# Patient Record
Sex: FEMALE | Race: BLACK OR AFRICAN AMERICAN | NOT HISPANIC OR LATINO | URBAN - METROPOLITAN AREA
[De-identification: names, ages, dates, MRNs, and addresses within clinical notes are randomized per-mention and may not be internally consistent; named-entity substitution may affect disease eponyms.]

---

## 2017-01-19 ENCOUNTER — OFFICE (OUTPATIENT)
Dept: URBAN - METROPOLITAN AREA CLINIC 19 | Facility: CLINIC | Age: 42
End: 2017-01-19

## 2017-01-19 VITALS
HEIGHT: 66 IN | HEART RATE: 81 BPM | DIASTOLIC BLOOD PRESSURE: 75 MMHG | SYSTOLIC BLOOD PRESSURE: 114 MMHG | WEIGHT: 166 LBS

## 2017-01-19 DIAGNOSIS — K26.9 DUODENAL ULCER, UNSPECIFIED AS ACUTE OR CHRONIC, WITHOUT HEM: ICD-10-CM

## 2017-01-19 DIAGNOSIS — D50.0 IRON DEFICIENCY ANEMIA SECONDARY TO BLOOD LOSS (CHRONIC): ICD-10-CM

## 2017-01-19 LAB
CBC COMPLETE BLOOD COUNT W/O DIFF: HEMATOCRIT: 35.9 % — LOW (ref 36–48)
CBC COMPLETE BLOOD COUNT W/O DIFF: HEMOGLOBIN: 11.2 G/DL — LOW (ref 12–16)
CBC COMPLETE BLOOD COUNT W/O DIFF: MCH: 25.6 PG (ref 25–35)
CBC COMPLETE BLOOD COUNT W/O DIFF: MCHC: 31.2 % (ref 30–38)
CBC COMPLETE BLOOD COUNT W/O DIFF: MCV: 82 FL (ref 78–102)
CBC COMPLETE BLOOD COUNT W/O DIFF: PLATELET COUNT: 331 K/UL (ref 150–450)
CBC COMPLETE BLOOD COUNT W/O DIFF: RBC DISTRIBUTION WIDTH: 14.5 % (ref 11.5–16)
CBC COMPLETE BLOOD COUNT W/O DIFF: RED BLOOD CELL COUNT: 4.38 M/UL (ref 4–5.5)
CBC COMPLETE BLOOD COUNT W/O DIFF: WHITE BLOOD CELL COUNT: 5.2 K/UL (ref 4–11)
COMPREHENSIVE METABOLIC PANEL: ALBUMIN: 4.2 G/DL (ref 3.5–5.2)
COMPREHENSIVE METABOLIC PANEL: ALKALINE PHOSPHATASE: 73 U/L (ref 34–115)
COMPREHENSIVE METABOLIC PANEL: CALCIUM TOTAL: 9.7 MG/DL (ref 8.5–10.5)
COMPREHENSIVE METABOLIC PANEL: CARBON DIOXIDE: 24 MEQ/L (ref 21–31)
COMPREHENSIVE METABOLIC PANEL: CHLORIDE: 101 MEQ/L (ref 96–106)
COMPREHENSIVE METABOLIC PANEL: CREATININE: 0.69 MG/DL (ref 0.6–1.3)
COMPREHENSIVE METABOLIC PANEL: FASTING/NON-FASTING: (no result)
COMPREHENSIVE METABOLIC PANEL: GLUCOSE: 81 MG/DL (ref 65–100)
COMPREHENSIVE METABOLIC PANEL: POTASSIUM: 3.8 MEQ/ML (ref 3.5–5.4)
COMPREHENSIVE METABOLIC PANEL: SGOT (AST): 21 U/L (ref 13–40)
COMPREHENSIVE METABOLIC PANEL: SGPT (ALT): 12 U/L (ref 7–52)
COMPREHENSIVE METABOLIC PANEL: SODIUM: 141 MEQ/L (ref 135–145)
COMPREHENSIVE METABOLIC PANEL: TOTAL BILIRUBIN: 0.3 MG/DL (ref 0.3–1.2)
COMPREHENSIVE METABOLIC PANEL: TOTAL PROTEIN: 7.5 G/DL (ref 6.4–8.3)
COMPREHENSIVE METABOLIC PANEL: UREA NITROGEN: 8 MG/DL (ref 6–20)

## 2017-01-19 PROCEDURE — 99214 OFFICE O/P EST MOD 30 MIN: CPT | Performed by: INTERNAL MEDICINE

## 2017-01-19 RX ORDER — ADALIMUMAB 40MG/0.8ML
KIT SUBCUTANEOUS
Qty: 2 | Refills: 11 | Status: COMPLETED
End: 2018-02-06

## 2017-01-19 RX ORDER — DEXLANSOPRAZOLE 60 MG/1
60 CAPSULE, DELAYED RELEASE ORAL
Qty: 30 | Refills: 5 | Status: COMPLETED
Start: 2016-09-22 | End: 2018-03-08

## 2017-01-19 RX ORDER — SODIUM PICOSULFATE, MAGNESIUM OXIDE, AND ANHYDROUS CITRIC ACID 10; 3.5; 12 MG/16.1G; G/16.1G; G/16.1G
POWDER, METERED ORAL
Qty: 1 | Refills: 0 | Status: COMPLETED
Start: 2017-01-19 | End: 2017-11-16

## 2017-01-19 NOTE — SERVICEHPINOTES
42-year-old  black female with long-standing Crohn's disease returns after she tapered off prednisone a month or so ago and is doing pretty well. She was hospitalized at [facility] last fall for abdominal pain. Routine lab was normal. Stool C. difficile toxin was negative. Stool lactoferrin was positive. CT abdomen/pelvis 9/17/16 found "distended stomach with subtle bowel wall thickening involving the gastroduodenal junction. Underlying gastric outlet obstruction secondary to active Crohn's inflammatory change cannot be excluded." EGD 9/19/16 found only "mild narrowing of the bulb". There was no inflammation or ulceration identified . Biopsies found "benign ulcer/erosion of the duodenum." Stain was negative for H. pylori. She was started on prednisone 40 mg/d (discharged on 20 mg/d) (Entocort was held). Prior to admission, she was on Imuran, Cipro (noncompliant) and has been on Humira for a year. She takes an occasional Lortab for abdominal pain. I switched Remicade to Humira when I last saw her 10/9/15. She was also referred to [facility] research dept[facility] but she wanted to see how Humira worked before considering a protocol study. She started Remicade last March, but has had problems with hives in spite of premedication with Benadryl and Solu-Medrol. During one of her infusion, EMT's were called, but she was not taken to the emergency room. Overall, she felt Remicade improved her Crohn's symptoms, but she wanted to switch to another biologic agent and I agreed. She remains on Imuran, Alinia, Entocort, iron and hydrocodone. Before starting Remicade earlier this year she complained of RLQ pain and significant nonbloody diarrhea with 10-15 BM/d and nocturnal stooling.I've been concerned about her IBD symptoms for quite a while and asked Usama Valdez MD ([facility] Director) to help. He did not feel she was a candidate for protocol studies considering her previous surgeries. I wanted her to see [place] IBD expert, Adithya Pierre MD, but she refused to travel to [place]. She has a history of severe Crohn's disease, but has really done quite well well in recent years, but has been somewhat dependent on prednisone in the past 9 months or so. She has been on what I believe to be maximal IBD medical therapy with Cimzia, Imuran, Alinia and Cipro. Cimzia was started in 2009 after failing Remicade (two doses given). She has some anxiety with Cimzia, but this is tolerable at the current dose. I added Entocort EC last June, but she stopped it because of side effects (UTI and xs menses). Routine lab 5/11/15 was remarkable for a mild microcytic anemia (Hct=30.9% and elevated CRP=0.6 and ESR=65). The last lab I have for comparison was on 11/1/13 which found Hct=29.6%, CRP=11.2 and CYJ=396. CT abd/pelvis 11/1/13 found active Crohn's disease involving her sigmoid and terminal ileum. There was no abscess or fistula. Her last colonoscopy 3/27/10 also found Crohn's ileitis with slight stricture at her ileocolic anastomosis with biopsies confirming "mildly active" Crohn's disease. CT abdomen/pelvis 3/31/10 found "residual fibrotic and reactive changes in the mesentery and perirectal area and a growing complex left ovarian mass (endometriosis vs cystadenoma)". She saw her gynecologist last fall and reports that a pelvic exam, urinalysis, and pelvic ultrasound were unremarkable. She was evaluated for a Crohn's protocol study by Dr. Valdez in 2008, but apparently deemed not a candidate at that time because of her previous extensive surgeries for Crohn's disease. FHx is negative for IBD or colon neoplasm.

## 2017-01-20 LAB
C-REACTIVE PROTEIN: 1 MG/DL — HIGH
SED RATE - ERYTHROCYTE SED RATE: SED RATE: 36 MM/HR — HIGH

## 2017-09-17 ENCOUNTER — INPATIENT HOSPITAL (OUTPATIENT)
Dept: URBAN - METROPOLITAN AREA HOSPITAL 95 | Facility: HOSPITAL | Age: 42
End: 2017-09-17

## 2017-09-17 DIAGNOSIS — K50.113 CROHN'S DISEASE OF LARGE INTESTINE WITH FISTULA: ICD-10-CM

## 2017-09-17 PROCEDURE — 99222 1ST HOSP IP/OBS MODERATE 55: CPT | Performed by: INTERNAL MEDICINE

## 2017-09-18 ENCOUNTER — INPATIENT HOSPITAL (OUTPATIENT)
Dept: URBAN - METROPOLITAN AREA HOSPITAL 95 | Facility: HOSPITAL | Age: 42
End: 2017-09-18

## 2017-09-18 DIAGNOSIS — K50.113 CROHN'S DISEASE OF LARGE INTESTINE WITH FISTULA: ICD-10-CM

## 2017-09-18 PROCEDURE — 99232 SBSQ HOSP IP/OBS MODERATE 35: CPT | Performed by: INTERNAL MEDICINE

## 2017-09-19 PROCEDURE — 99232 SBSQ HOSP IP/OBS MODERATE 35: CPT | Performed by: INTERNAL MEDICINE

## 2017-09-20 PROCEDURE — 99232 SBSQ HOSP IP/OBS MODERATE 35: CPT | Performed by: INTERNAL MEDICINE

## 2017-09-21 PROCEDURE — 99232 SBSQ HOSP IP/OBS MODERATE 35: CPT | Performed by: INTERNAL MEDICINE

## 2017-09-25 ENCOUNTER — INPATIENT HOSPITAL (OUTPATIENT)
Dept: URBAN - METROPOLITAN AREA HOSPITAL 130 | Facility: HOSPITAL | Age: 42
End: 2017-09-25

## 2017-09-25 DIAGNOSIS — K50.113 CROHN'S DISEASE OF LARGE INTESTINE WITH FISTULA: ICD-10-CM

## 2017-09-25 PROCEDURE — 99221 1ST HOSP IP/OBS SF/LOW 40: CPT | Performed by: INTERNAL MEDICINE

## 2017-09-26 ENCOUNTER — INPATIENT HOSPITAL (OUTPATIENT)
Dept: URBAN - METROPOLITAN AREA HOSPITAL 130 | Facility: HOSPITAL | Age: 42
End: 2017-09-26

## 2017-09-26 DIAGNOSIS — K50.118 CROHN'S DISEASE OF LARGE INTESTINE WITH OTHER COMPLICATION: ICD-10-CM

## 2017-09-26 PROCEDURE — 99231 SBSQ HOSP IP/OBS SF/LOW 25: CPT | Performed by: INTERNAL MEDICINE

## 2017-11-16 ENCOUNTER — OFFICE (OUTPATIENT)
Dept: URBAN - METROPOLITAN AREA CLINIC 19 | Facility: CLINIC | Age: 42
End: 2017-11-16

## 2017-11-16 VITALS
DIASTOLIC BLOOD PRESSURE: 67 MMHG | HEART RATE: 91 BPM | WEIGHT: 154 LBS | HEIGHT: 66 IN | SYSTOLIC BLOOD PRESSURE: 106 MMHG

## 2017-11-16 DIAGNOSIS — E46 UNSPECIFIED PROTEIN-CALORIE MALNUTRITION: ICD-10-CM

## 2017-11-16 PROCEDURE — 99214 OFFICE O/P EST MOD 30 MIN: CPT | Performed by: INTERNAL MEDICINE

## 2017-12-11 ENCOUNTER — OFFICE (OUTPATIENT)
Dept: URBAN - METROPOLITAN AREA CLINIC 14 | Facility: CLINIC | Age: 42
End: 2017-12-11

## 2017-12-11 VITALS
WEIGHT: 157 LBS | HEART RATE: 89 BPM | SYSTOLIC BLOOD PRESSURE: 118 MMHG | RESPIRATION RATE: 16 BRPM | DIASTOLIC BLOOD PRESSURE: 69 MMHG | HEIGHT: 66 IN

## 2017-12-11 DIAGNOSIS — K50.818 CROHN'S DISEASE OF BOTH SMALL AND LARGE INTESTINE WITH OTHER: ICD-10-CM

## 2017-12-11 LAB
C-REACTIVE PROTEIN, QUANT: 29.1 MG/L — HIGH (ref 0–4.9)
CBC, PLATELET, NO DIFFERENTIAL: HEMATOCRIT: 27 % — LOW (ref 34–46.6)
CBC, PLATELET, NO DIFFERENTIAL: HEMOGLOBIN: 8.1 G/DL — LOW (ref 11.1–15.9)
CBC, PLATELET, NO DIFFERENTIAL: MCH: 23.2 PG — LOW (ref 26.6–33)
CBC, PLATELET, NO DIFFERENTIAL: MCHC: 30 G/DL — LOW (ref 31.5–35.7)
CBC, PLATELET, NO DIFFERENTIAL: MCV: 77 FL — LOW (ref 79–97)
CBC, PLATELET, NO DIFFERENTIAL: PLATELETS: 414 X10E3/UL — HIGH (ref 150–379)
CBC, PLATELET, NO DIFFERENTIAL: RBC: 3.49 X10E6/UL — LOW (ref 3.77–5.28)
CBC, PLATELET, NO DIFFERENTIAL: RDW: 16.2 % — HIGH (ref 12.3–15.4)
CBC, PLATELET, NO DIFFERENTIAL: WBC: 6 X10E3/UL (ref 3.4–10.8)
COMP. METABOLIC PANEL (14): A/G RATIO: 1.1 — LOW (ref 1.2–2.2)
COMP. METABOLIC PANEL (14): ALBUMIN, SERUM: 3.9 G/DL (ref 3.5–5.5)
COMP. METABOLIC PANEL (14): ALKALINE PHOSPHATASE, S: 98 IU/L (ref 39–117)
COMP. METABOLIC PANEL (14): ALT (SGPT): 18 IU/L (ref 0–32)
COMP. METABOLIC PANEL (14): AST (SGOT): 22 IU/L (ref 0–40)
COMP. METABOLIC PANEL (14): BILIRUBIN, TOTAL: 0.3 MG/DL (ref 0–1.2)
COMP. METABOLIC PANEL (14): BUN/CREATININE RATIO: 9 (ref 9–23)
COMP. METABOLIC PANEL (14): BUN: 5 MG/DL — LOW (ref 6–24)
COMP. METABOLIC PANEL (14): CALCIUM, SERUM: 9.6 MG/DL (ref 8.7–10.2)
COMP. METABOLIC PANEL (14): CARBON DIOXIDE, TOTAL: 20 MMOL/L (ref 18–29)
COMP. METABOLIC PANEL (14): CHLORIDE, SERUM: 101 MMOL/L (ref 96–106)
COMP. METABOLIC PANEL (14): CREATININE, SERUM: 0.54 MG/DL — LOW (ref 0.57–1)
COMP. METABOLIC PANEL (14): EGFR IF AFRICN AM: 136 ML/MIN/1.73 (ref 59–?)
COMP. METABOLIC PANEL (14): EGFR IF NONAFRICN AM: 118 ML/MIN/1.73 (ref 59–?)
COMP. METABOLIC PANEL (14): GLOBULIN, TOTAL: 3.4 G/DL (ref 1.5–4.5)
COMP. METABOLIC PANEL (14): GLUCOSE, SERUM: 88 MG/DL (ref 65–99)
COMP. METABOLIC PANEL (14): POTASSIUM, SERUM: 3.9 MMOL/L (ref 3.5–5.2)
COMP. METABOLIC PANEL (14): PROTEIN, TOTAL, SERUM: 7.3 G/DL (ref 6–8.5)
COMP. METABOLIC PANEL (14): SODIUM, SERUM: 142 MMOL/L (ref 134–144)
FE+TIBC+FER: FERRITIN, SERUM: 26 NG/ML (ref 15–150)
FE+TIBC+FER: IRON BIND.CAP.(TIBC): 384 UG/DL (ref 250–450)
FE+TIBC+FER: IRON SATURATION: 14 % — LOW (ref 15–55)
FE+TIBC+FER: IRON, SERUM: 55 UG/DL (ref 27–159)
FE+TIBC+FER: UIBC: 329 UG/DL (ref 131–425)
PREALBUMIN: 19 MG/DL (ref 12–34)

## 2017-12-11 PROCEDURE — 99214 OFFICE O/P EST MOD 30 MIN: CPT | Performed by: INTERNAL MEDICINE

## 2017-12-11 RX ORDER — DIPHENOXYLATE HYDROCHLORIDE AND ATROPINE SULFATE 2.5; .025 MG/1; MG/1
10 TABLET ORAL
Qty: 120 | Refills: 3 | Status: ACTIVE
Start: 2017-12-11

## 2017-12-11 RX ORDER — PREDNISONE 5 MG/1
TABLET ORAL
Qty: 30 | Refills: 1 | Status: COMPLETED
End: 2019-09-25

## 2017-12-19 ENCOUNTER — OFFICE (OUTPATIENT)
Dept: URBAN - METROPOLITAN AREA CLINIC 11 | Facility: CLINIC | Age: 42
End: 2017-12-19

## 2017-12-19 DIAGNOSIS — K50.90 CROHN'S DISEASE, UNSPECIFIED, WITHOUT COMPLICATIONS: ICD-10-CM

## 2017-12-19 DIAGNOSIS — D50.0 IRON DEFICIENCY ANEMIA SECONDARY TO BLOOD LOSS (CHRONIC): ICD-10-CM

## 2017-12-19 DIAGNOSIS — T45.4X5A ADVERSE EFFECT OF IRON AND ITS COMPOUNDS, INITIAL ENCOUNTER: ICD-10-CM

## 2017-12-19 PROCEDURE — 96365 THER/PROPH/DIAG IV INF INIT: CPT | Performed by: INTERNAL MEDICINE

## 2017-12-26 ENCOUNTER — OFFICE (OUTPATIENT)
Dept: URBAN - METROPOLITAN AREA CLINIC 11 | Facility: CLINIC | Age: 42
End: 2017-12-26

## 2017-12-26 DIAGNOSIS — K50.90 CROHN'S DISEASE, UNSPECIFIED, WITHOUT COMPLICATIONS: ICD-10-CM

## 2017-12-26 DIAGNOSIS — T45.4X5A ADVERSE EFFECT OF IRON AND ITS COMPOUNDS, INITIAL ENCOUNTER: ICD-10-CM

## 2017-12-26 DIAGNOSIS — D50.0 IRON DEFICIENCY ANEMIA SECONDARY TO BLOOD LOSS (CHRONIC): ICD-10-CM

## 2017-12-26 PROCEDURE — 96365 THER/PROPH/DIAG IV INF INIT: CPT | Performed by: INTERNAL MEDICINE

## 2018-01-12 ENCOUNTER — ON CAMPUS - OUTPATIENT (OUTPATIENT)
Dept: URBAN - METROPOLITAN AREA HOSPITAL 97 | Facility: HOSPITAL | Age: 43
End: 2018-01-12

## 2018-01-12 DIAGNOSIS — K50.118 CROHN'S DISEASE OF LARGE INTESTINE WITH OTHER COMPLICATION: ICD-10-CM

## 2018-01-12 PROCEDURE — 45334 SIGMOIDOSCOPY FOR BLEEDING: CPT | Performed by: INTERNAL MEDICINE

## 2018-02-05 ENCOUNTER — ON CAMPUS - OUTPATIENT (OUTPATIENT)
Dept: URBAN - METROPOLITAN AREA HOSPITAL 97 | Facility: HOSPITAL | Age: 43
End: 2018-02-05

## 2018-02-05 DIAGNOSIS — K50.113 CROHN'S DISEASE OF LARGE INTESTINE WITH FISTULA: ICD-10-CM

## 2018-02-05 PROCEDURE — 45330 DIAGNOSTIC SIGMOIDOSCOPY: CPT | Performed by: INTERNAL MEDICINE

## 2018-02-15 ENCOUNTER — OFFICE (OUTPATIENT)
Dept: URBAN - METROPOLITAN AREA CLINIC 11 | Facility: CLINIC | Age: 43
End: 2018-02-15
Payer: COMMERCIAL

## 2018-02-15 ENCOUNTER — OFFICE (OUTPATIENT)
Dept: URBAN - METROPOLITAN AREA CLINIC 11 | Facility: CLINIC | Age: 43
End: 2018-02-15

## 2018-02-15 DIAGNOSIS — K50.913 CROHN'S DISEASE, UNSPECIFIED, WITH FISTULA: ICD-10-CM

## 2018-02-15 PROCEDURE — 96365 THER/PROPH/DIAG IV INF INIT: CPT | Performed by: INTERNAL MEDICINE

## 2018-03-08 ENCOUNTER — OFFICE (OUTPATIENT)
Dept: URBAN - METROPOLITAN AREA CLINIC 19 | Facility: CLINIC | Age: 43
End: 2018-03-08

## 2018-03-08 VITALS
HEIGHT: 67 IN | WEIGHT: 168 LBS | SYSTOLIC BLOOD PRESSURE: 126 MMHG | HEART RATE: 93 BPM | DIASTOLIC BLOOD PRESSURE: 78 MMHG

## 2018-03-08 DIAGNOSIS — K50.913 CROHN'S DISEASE, UNSPECIFIED, WITH FISTULA: ICD-10-CM

## 2018-03-08 PROCEDURE — 99214 OFFICE O/P EST MOD 30 MIN: CPT | Performed by: INTERNAL MEDICINE

## 2018-03-08 RX ORDER — DIPHENOXYLATE HYDROCHLORIDE AND ATROPINE SULFATE 2.5; .025 MG/1; MG/1
10 TABLET ORAL
Qty: 120 | Refills: 3 | Status: ACTIVE
Start: 2017-12-11

## 2018-03-08 RX ORDER — CIPROFLOXACIN 500 MG/1
1000 TABLET, FILM COATED ORAL
Qty: 60 | Refills: 6 | Status: COMPLETED
Start: 2017-11-16 | End: 2019-09-25

## 2018-07-24 ENCOUNTER — OFFICE (OUTPATIENT)
Dept: URBAN - METROPOLITAN AREA CLINIC 19 | Facility: CLINIC | Age: 43
End: 2018-07-24
Payer: COMMERCIAL

## 2018-07-24 VITALS
DIASTOLIC BLOOD PRESSURE: 74 MMHG | SYSTOLIC BLOOD PRESSURE: 115 MMHG | HEIGHT: 67 IN | HEART RATE: 93 BPM | WEIGHT: 172 LBS

## 2018-07-24 DIAGNOSIS — K50.913 CROHN'S DISEASE, UNSPECIFIED, WITH FISTULA: ICD-10-CM

## 2018-07-24 PROCEDURE — 99214 OFFICE O/P EST MOD 30 MIN: CPT | Performed by: INTERNAL MEDICINE

## 2018-07-24 RX ORDER — DEXLANSOPRAZOLE 60 MG/1
60 CAPSULE, DELAYED RELEASE ORAL
Qty: 30 | Refills: 11 | Status: COMPLETED
End: 2022-09-28

## 2018-07-24 RX ORDER — PREDNISONE 5 MG/1
TABLET ORAL
Qty: 30 | Refills: 1 | Status: COMPLETED
End: 2019-09-25

## 2018-07-24 RX ORDER — DIPHENOXYLATE HYDROCHLORIDE AND ATROPINE SULFATE 2.5; .025 MG/1; MG/1
10 TABLET ORAL
Qty: 120 | Refills: 3 | Status: ACTIVE
Start: 2017-12-11

## 2018-07-31 ENCOUNTER — OFFICE (OUTPATIENT)
Dept: URBAN - METROPOLITAN AREA CLINIC 11 | Facility: CLINIC | Age: 43
End: 2018-07-31
Payer: COMMERCIAL

## 2018-07-31 DIAGNOSIS — K50.818 CROHN'S DISEASE OF BOTH SMALL AND LARGE INTESTINE WITH OTHER: ICD-10-CM

## 2018-07-31 PROCEDURE — 96413 CHEMO IV INFUSION 1 HR: CPT | Performed by: INTERNAL MEDICINE

## 2018-08-14 ENCOUNTER — OFFICE (OUTPATIENT)
Dept: URBAN - METROPOLITAN AREA CLINIC 11 | Facility: CLINIC | Age: 43
End: 2018-08-14
Payer: COMMERCIAL

## 2018-08-14 DIAGNOSIS — K50.913 CROHN'S DISEASE, UNSPECIFIED, WITH FISTULA: ICD-10-CM

## 2018-08-14 PROCEDURE — 96413 CHEMO IV INFUSION 1 HR: CPT | Performed by: INTERNAL MEDICINE

## 2018-09-11 ENCOUNTER — OFFICE (OUTPATIENT)
Dept: URBAN - METROPOLITAN AREA CLINIC 11 | Facility: CLINIC | Age: 43
End: 2018-09-11
Payer: COMMERCIAL

## 2018-09-11 DIAGNOSIS — K50.913 CROHN'S DISEASE, UNSPECIFIED, WITH FISTULA: ICD-10-CM

## 2018-09-11 PROCEDURE — 96413 CHEMO IV INFUSION 1 HR: CPT | Performed by: INTERNAL MEDICINE

## 2018-11-06 ENCOUNTER — OFFICE (OUTPATIENT)
Dept: URBAN - METROPOLITAN AREA CLINIC 11 | Facility: CLINIC | Age: 43
End: 2018-11-06
Payer: COMMERCIAL

## 2018-11-06 DIAGNOSIS — K50.913 CROHN'S DISEASE, UNSPECIFIED, WITH FISTULA: ICD-10-CM

## 2018-11-06 PROCEDURE — 96413 CHEMO IV INFUSION 1 HR: CPT | Performed by: INTERNAL MEDICINE

## 2018-12-31 ENCOUNTER — OFFICE (OUTPATIENT)
Dept: URBAN - METROPOLITAN AREA CLINIC 11 | Facility: CLINIC | Age: 43
End: 2018-12-31
Payer: COMMERCIAL

## 2018-12-31 DIAGNOSIS — K50.913 CROHN'S DISEASE, UNSPECIFIED, WITH FISTULA: ICD-10-CM

## 2018-12-31 PROCEDURE — 96413 CHEMO IV INFUSION 1 HR: CPT | Performed by: INTERNAL MEDICINE

## 2019-01-25 ENCOUNTER — OFFICE (OUTPATIENT)
Dept: URBAN - METROPOLITAN AREA CLINIC 19 | Facility: CLINIC | Age: 44
End: 2019-01-25

## 2019-01-25 VITALS
WEIGHT: 195 LBS | HEIGHT: 67 IN | HEART RATE: 94 BPM | DIASTOLIC BLOOD PRESSURE: 77 MMHG | SYSTOLIC BLOOD PRESSURE: 119 MMHG

## 2019-01-25 DIAGNOSIS — D50.0 IRON DEFICIENCY ANEMIA SECONDARY TO BLOOD LOSS (CHRONIC): ICD-10-CM

## 2019-01-25 PROCEDURE — 99214 OFFICE O/P EST MOD 30 MIN: CPT | Performed by: INTERNAL MEDICINE

## 2019-01-25 RX ORDER — CIPROFLOXACIN 500 MG/1
1000 TABLET, FILM COATED ORAL
Qty: 60 | Refills: 6 | Status: COMPLETED
Start: 2017-11-16 | End: 2019-09-25

## 2019-01-25 RX ORDER — DEXLANSOPRAZOLE 60 MG/1
60 CAPSULE, DELAYED RELEASE ORAL
Qty: 30 | Refills: 11 | Status: COMPLETED
End: 2022-09-28

## 2019-01-25 RX ORDER — DIPHENOXYLATE HYDROCHLORIDE AND ATROPINE SULFATE 2.5; .025 MG/1; MG/1
10 TABLET ORAL
Qty: 120 | Refills: 3 | Status: ACTIVE
Start: 2017-12-11

## 2019-03-01 ENCOUNTER — OFFICE (OUTPATIENT)
Dept: URBAN - METROPOLITAN AREA CLINIC 11 | Facility: CLINIC | Age: 44
End: 2019-03-01

## 2019-03-01 DIAGNOSIS — K50.913 CROHN'S DISEASE, UNSPECIFIED, WITH FISTULA: ICD-10-CM

## 2019-03-01 PROCEDURE — 96413 CHEMO IV INFUSION 1 HR: CPT | Performed by: INTERNAL MEDICINE

## 2019-03-22 ENCOUNTER — OFFICE (OUTPATIENT)
Dept: URBAN - METROPOLITAN AREA CLINIC 19 | Facility: CLINIC | Age: 44
End: 2019-03-22

## 2019-04-23 ENCOUNTER — OFFICE (OUTPATIENT)
Dept: URBAN - METROPOLITAN AREA CLINIC 11 | Facility: CLINIC | Age: 44
End: 2019-04-23

## 2019-04-23 DIAGNOSIS — K50.913 CROHN'S DISEASE, UNSPECIFIED, WITH FISTULA: ICD-10-CM

## 2019-04-23 PROCEDURE — 96413 CHEMO IV INFUSION 1 HR: CPT | Performed by: INTERNAL MEDICINE

## 2019-06-25 ENCOUNTER — OFFICE (OUTPATIENT)
Dept: URBAN - METROPOLITAN AREA CLINIC 11 | Facility: CLINIC | Age: 44
End: 2019-06-25

## 2019-06-25 DIAGNOSIS — K50.913 CROHN'S DISEASE, UNSPECIFIED, WITH FISTULA: ICD-10-CM

## 2019-06-25 PROCEDURE — 96413 CHEMO IV INFUSION 1 HR: CPT | Performed by: INTERNAL MEDICINE

## 2019-07-18 ENCOUNTER — OFFICE (OUTPATIENT)
Dept: URBAN - METROPOLITAN AREA CLINIC 19 | Facility: CLINIC | Age: 44
End: 2019-07-18

## 2019-07-18 VITALS
HEART RATE: 71 BPM | DIASTOLIC BLOOD PRESSURE: 77 MMHG | HEIGHT: 67 IN | WEIGHT: 191 LBS | SYSTOLIC BLOOD PRESSURE: 121 MMHG

## 2019-07-18 DIAGNOSIS — R74.0 NONSPECIFIC ELEVATION OF LEVELS OF TRANSAMINASE AND LACTIC A: ICD-10-CM

## 2019-07-18 DIAGNOSIS — D50.0 IRON DEFICIENCY ANEMIA SECONDARY TO BLOOD LOSS (CHRONIC): ICD-10-CM

## 2019-07-18 DIAGNOSIS — K21.9 GASTRO-ESOPHAGEAL REFLUX DISEASE WITHOUT ESOPHAGITIS: ICD-10-CM

## 2019-07-18 DIAGNOSIS — R14.1 GAS PAIN: ICD-10-CM

## 2019-07-18 PROCEDURE — 99213 OFFICE O/P EST LOW 20 MIN: CPT

## 2019-07-18 RX ORDER — DIPHENOXYLATE HYDROCHLORIDE AND ATROPINE SULFATE 2.5; .025 MG/1; MG/1
10 TABLET ORAL
Qty: 120 | Refills: 3 | Status: ACTIVE
Start: 2017-12-11

## 2019-07-18 RX ORDER — FERROUS SULFATE 325(65) MG
650 TABLET ORAL
Qty: 60 | Refills: 11 | Status: ACTIVE

## 2019-07-18 RX ORDER — DEXLANSOPRAZOLE 60 MG/1
60 CAPSULE, DELAYED RELEASE ORAL
Qty: 30 | Refills: 11 | Status: COMPLETED
End: 2022-09-28

## 2019-07-18 RX ORDER — SODIUM PICOSULFATE, MAGNESIUM OXIDE, AND ANHYDROUS CITRIC ACID 10; 3.5; 12 MG/160ML; G/160ML; G/160ML
LIQUID ORAL
Qty: 1 | Refills: 0 | Status: COMPLETED
Start: 2019-07-18 | End: 2019-09-25

## 2019-08-22 ENCOUNTER — OFFICE (OUTPATIENT)
Dept: URBAN - METROPOLITAN AREA CLINIC 11 | Facility: CLINIC | Age: 44
End: 2019-08-22

## 2019-08-22 DIAGNOSIS — K50.913 CROHN'S DISEASE, UNSPECIFIED, WITH FISTULA: ICD-10-CM

## 2019-08-22 PROCEDURE — 96413 CHEMO IV INFUSION 1 HR: CPT | Performed by: INTERNAL MEDICINE

## 2019-08-23 ENCOUNTER — OFFICE (OUTPATIENT)
Dept: URBAN - METROPOLITAN AREA CLINIC 19 | Facility: CLINIC | Age: 44
End: 2019-08-23

## 2019-09-25 ENCOUNTER — AMBULATORY SURGICAL CENTER (OUTPATIENT)
Dept: URBAN - METROPOLITAN AREA SURGERY 2 | Facility: SURGERY | Age: 44
End: 2019-09-25
Payer: MEDICAID

## 2019-09-25 ENCOUNTER — AMBULATORY SURGICAL CENTER (OUTPATIENT)
Dept: URBAN - METROPOLITAN AREA SURGERY 2 | Facility: SURGERY | Age: 44
End: 2019-09-25

## 2019-09-25 ENCOUNTER — OFFICE (OUTPATIENT)
Dept: URBAN - METROPOLITAN AREA CLINIC 19 | Facility: CLINIC | Age: 44
End: 2019-09-25

## 2019-09-25 ENCOUNTER — OFFICE (OUTPATIENT)
Dept: URBAN - METROPOLITAN AREA PATHOLOGY 22 | Facility: PATHOLOGY | Age: 44
End: 2019-09-25

## 2019-09-25 VITALS
OXYGEN SATURATION: 97 % | HEART RATE: 90 BPM | HEART RATE: 101 BPM | SYSTOLIC BLOOD PRESSURE: 120 MMHG | SYSTOLIC BLOOD PRESSURE: 124 MMHG | RESPIRATION RATE: 18 BRPM | OXYGEN SATURATION: 97 % | OXYGEN SATURATION: 96 % | SYSTOLIC BLOOD PRESSURE: 118 MMHG | DIASTOLIC BLOOD PRESSURE: 66 MMHG | SYSTOLIC BLOOD PRESSURE: 113 MMHG | SYSTOLIC BLOOD PRESSURE: 113 MMHG | HEART RATE: 91 BPM | OXYGEN SATURATION: 97 % | HEART RATE: 90 BPM | HEART RATE: 91 BPM | WEIGHT: 185 LBS | HEIGHT: 67 IN | HEART RATE: 101 BPM | TEMPERATURE: 98.1 F | RESPIRATION RATE: 20 BRPM | TEMPERATURE: 98.1 F | DIASTOLIC BLOOD PRESSURE: 71 MMHG | TEMPERATURE: 97.7 F | RESPIRATION RATE: 20 BRPM | WEIGHT: 185 LBS | DIASTOLIC BLOOD PRESSURE: 70 MMHG | TEMPERATURE: 98.1 F | OXYGEN SATURATION: 96 % | DIASTOLIC BLOOD PRESSURE: 66 MMHG | HEIGHT: 67 IN | HEART RATE: 91 BPM | OXYGEN SATURATION: 96 % | SYSTOLIC BLOOD PRESSURE: 124 MMHG | WEIGHT: 185 LBS | HEART RATE: 96 BPM | HEART RATE: 96 BPM | RESPIRATION RATE: 18 BRPM | DIASTOLIC BLOOD PRESSURE: 66 MMHG | DIASTOLIC BLOOD PRESSURE: 57 MMHG | TEMPERATURE: 97.7 F | HEART RATE: 90 BPM | SYSTOLIC BLOOD PRESSURE: 113 MMHG | DIASTOLIC BLOOD PRESSURE: 70 MMHG | HEART RATE: 96 BPM | SYSTOLIC BLOOD PRESSURE: 124 MMHG | DIASTOLIC BLOOD PRESSURE: 71 MMHG | DIASTOLIC BLOOD PRESSURE: 57 MMHG | TEMPERATURE: 97.7 F | DIASTOLIC BLOOD PRESSURE: 70 MMHG | DIASTOLIC BLOOD PRESSURE: 57 MMHG | DIASTOLIC BLOOD PRESSURE: 71 MMHG | SYSTOLIC BLOOD PRESSURE: 120 MMHG | SYSTOLIC BLOOD PRESSURE: 120 MMHG | SYSTOLIC BLOOD PRESSURE: 118 MMHG | RESPIRATION RATE: 20 BRPM | HEART RATE: 101 BPM | HEIGHT: 67 IN | SYSTOLIC BLOOD PRESSURE: 118 MMHG | RESPIRATION RATE: 18 BRPM

## 2019-09-25 DIAGNOSIS — K63.3 ULCER OF INTESTINE: ICD-10-CM

## 2019-09-25 DIAGNOSIS — K29.80 DUODENITIS WITHOUT BLEEDING: ICD-10-CM

## 2019-09-25 DIAGNOSIS — K26.9 DUODENAL ULCER, UNSPECIFIED AS ACUTE OR CHRONIC, WITHOUT HEM: ICD-10-CM

## 2019-09-25 DIAGNOSIS — K21.9 GASTRO-ESOPHAGEAL REFLUX DISEASE WITHOUT ESOPHAGITIS: ICD-10-CM

## 2019-09-25 DIAGNOSIS — K21.0 GASTRO-ESOPHAGEAL REFLUX DISEASE WITH ESOPHAGITIS: ICD-10-CM

## 2019-09-25 LAB
C-REACTIVE PROTEIN, QUANT: 17 MG/L — HIGH (ref 0–10)
CBC, PLATELET, NO DIFFERENTIAL: HEMATOCRIT: 29.7 % — LOW (ref 34–46.6)
CBC, PLATELET, NO DIFFERENTIAL: HEMOGLOBIN: 8.6 G/DL — LOW (ref 11.1–15.9)
CBC, PLATELET, NO DIFFERENTIAL: MCH: 21.8 PG — LOW (ref 26.6–33)
CBC, PLATELET, NO DIFFERENTIAL: MCHC: 29 G/DL — LOW (ref 31.5–35.7)
CBC, PLATELET, NO DIFFERENTIAL: MCV: 75 FL — LOW (ref 79–97)
CBC, PLATELET, NO DIFFERENTIAL: PLATELETS: 341 X10E3/UL (ref 150–450)
CBC, PLATELET, NO DIFFERENTIAL: RBC: 3.95 X10E6/UL (ref 3.77–5.28)
CBC, PLATELET, NO DIFFERENTIAL: RDW: 16 % — HIGH (ref 12.3–15.4)
CBC, PLATELET, NO DIFFERENTIAL: WBC: 4.4 X10E3/UL (ref 3.4–10.8)
COMP. METABOLIC PANEL (14): A/G RATIO: 1.7 (ref 1.2–2.2)
COMP. METABOLIC PANEL (14): ALBUMIN: 4.5 G/DL (ref 3.5–5.5)
COMP. METABOLIC PANEL (14): ALKALINE PHOSPHATASE: 99 IU/L (ref 39–117)
COMP. METABOLIC PANEL (14): ALT (SGPT): 39 IU/L — HIGH (ref 0–32)
COMP. METABOLIC PANEL (14): AST (SGOT): 51 IU/L — HIGH (ref 0–40)
COMP. METABOLIC PANEL (14): BILIRUBIN, TOTAL: 0.5 MG/DL (ref 0–1.2)
COMP. METABOLIC PANEL (14): BUN/CREATININE RATIO: 7 — LOW (ref 9–23)
COMP. METABOLIC PANEL (14): BUN: 6 MG/DL (ref 6–24)
COMP. METABOLIC PANEL (14): CALCIUM: 9.8 MG/DL (ref 8.7–10.2)
COMP. METABOLIC PANEL (14): CARBON DIOXIDE, TOTAL: 27 MMOL/L (ref 20–29)
COMP. METABOLIC PANEL (14): CHLORIDE: 99 MMOL/L (ref 96–106)
COMP. METABOLIC PANEL (14): CREATININE: 0.83 MG/DL (ref 0.57–1)
COMP. METABOLIC PANEL (14): EGFR IF AFRICN AM: 100 ML/MIN/1.73 (ref 59–?)
COMP. METABOLIC PANEL (14): EGFR IF NONAFRICN AM: 87 ML/MIN/1.73 (ref 59–?)
COMP. METABOLIC PANEL (14): GLOBULIN, TOTAL: 2.7 G/DL (ref 1.5–4.5)
COMP. METABOLIC PANEL (14): GLUCOSE: 88 MG/DL (ref 65–99)
COMP. METABOLIC PANEL (14): POTASSIUM: 4.2 MMOL/L (ref 3.5–5.2)
COMP. METABOLIC PANEL (14): PROTEIN, TOTAL: 7.2 G/DL (ref 6–8.5)
COMP. METABOLIC PANEL (14): SODIUM: 140 MMOL/L (ref 134–144)
SEDIMENTATION RATE-WESTERGREN: 24 MM/HR (ref 0–32)

## 2019-09-25 PROCEDURE — 88342 IMHCHEM/IMCYTCHM 1ST ANTB: CPT | Performed by: INTERNAL MEDICINE

## 2019-09-25 PROCEDURE — 88305 TISSUE EXAM BY PATHOLOGIST: CPT | Performed by: INTERNAL MEDICINE

## 2019-09-25 PROCEDURE — 43239 EGD BIOPSY SINGLE/MULTIPLE: CPT | Mod: 51 | Performed by: INTERNAL MEDICINE

## 2019-09-25 PROCEDURE — 45380 COLONOSCOPY AND BIOPSY: CPT | Performed by: INTERNAL MEDICINE

## 2019-09-25 PROCEDURE — 43239 EGD BIOPSY SINGLE/MULTIPLE: CPT | Performed by: INTERNAL MEDICINE

## 2019-09-25 PROCEDURE — G8907 PT DOC NO EVENTS ON DISCHARG: HCPCS | Performed by: INTERNAL MEDICINE

## 2019-09-25 PROCEDURE — G8918 PT W/O PREOP ORDER IV AB PRO: HCPCS | Performed by: INTERNAL MEDICINE

## 2019-10-17 ENCOUNTER — OFFICE (OUTPATIENT)
Dept: URBAN - METROPOLITAN AREA CLINIC 11 | Facility: CLINIC | Age: 44
End: 2019-10-17

## 2019-10-17 DIAGNOSIS — T45.4X5A ADVERSE EFFECT OF IRON AND ITS COMPOUNDS, INITIAL ENCOUNTER: ICD-10-CM

## 2019-10-17 DIAGNOSIS — K50.913 CROHN'S DISEASE, UNSPECIFIED, WITH FISTULA: ICD-10-CM

## 2019-10-17 DIAGNOSIS — D50.9 IRON DEFICIENCY ANEMIA, UNSPECIFIED: ICD-10-CM

## 2019-10-17 PROCEDURE — 96413 CHEMO IV INFUSION 1 HR: CPT | Performed by: INTERNAL MEDICINE

## 2019-10-24 ENCOUNTER — OFFICE (OUTPATIENT)
Dept: URBAN - METROPOLITAN AREA CLINIC 11 | Facility: CLINIC | Age: 44
End: 2019-10-24

## 2019-10-24 DIAGNOSIS — D50.9 IRON DEFICIENCY ANEMIA, UNSPECIFIED: ICD-10-CM

## 2019-10-24 DIAGNOSIS — T45.4X5A ADVERSE EFFECT OF IRON AND ITS COMPOUNDS, INITIAL ENCOUNTER: ICD-10-CM

## 2019-10-24 PROCEDURE — 96365 THER/PROPH/DIAG IV INF INIT: CPT | Performed by: INTERNAL MEDICINE

## 2019-12-20 ENCOUNTER — OFFICE (OUTPATIENT)
Dept: URBAN - METROPOLITAN AREA CLINIC 11 | Facility: CLINIC | Age: 44
End: 2019-12-20

## 2019-12-20 DIAGNOSIS — K50.913 CROHN'S DISEASE, UNSPECIFIED, WITH FISTULA: ICD-10-CM

## 2019-12-20 PROCEDURE — 96413 CHEMO IV INFUSION 1 HR: CPT | Performed by: INTERNAL MEDICINE

## 2020-01-31 ENCOUNTER — OFFICE (OUTPATIENT)
Dept: URBAN - METROPOLITAN AREA CLINIC 19 | Facility: CLINIC | Age: 45
End: 2020-01-31
Payer: MEDICAID

## 2020-01-31 VITALS
DIASTOLIC BLOOD PRESSURE: 77 MMHG | HEART RATE: 73 BPM | WEIGHT: 194 LBS | SYSTOLIC BLOOD PRESSURE: 130 MMHG | HEIGHT: 67 IN

## 2020-01-31 DIAGNOSIS — R14.1 GAS PAIN: ICD-10-CM

## 2020-01-31 DIAGNOSIS — K26.9 DUODENAL ULCER, UNSPECIFIED AS ACUTE OR CHRONIC, WITHOUT HEM: ICD-10-CM

## 2020-01-31 DIAGNOSIS — K21.9 GASTRO-ESOPHAGEAL REFLUX DISEASE WITHOUT ESOPHAGITIS: ICD-10-CM

## 2020-01-31 DIAGNOSIS — R74.0 NONSPECIFIC ELEVATION OF LEVELS OF TRANSAMINASE AND LACTIC A: ICD-10-CM

## 2020-01-31 DIAGNOSIS — K50.913 CROHN'S DISEASE, UNSPECIFIED, WITH FISTULA: ICD-10-CM

## 2020-01-31 LAB
C-REACTIVE PROTEIN, QUANT: 23 MG/L — HIGH (ref 0–10)
CBC, PLATELET, NO DIFFERENTIAL: HEMATOCRIT: 34.8 % (ref 34–46.6)
CBC, PLATELET, NO DIFFERENTIAL: HEMOGLOBIN: 11.3 G/DL (ref 11.1–15.9)
CBC, PLATELET, NO DIFFERENTIAL: MCH: 26.7 PG (ref 26.6–33)
CBC, PLATELET, NO DIFFERENTIAL: MCHC: 32.5 G/DL (ref 31.5–35.7)
CBC, PLATELET, NO DIFFERENTIAL: MCV: 82 FL (ref 79–97)
CBC, PLATELET, NO DIFFERENTIAL: PLATELETS: 310 X10E3/UL (ref 150–450)
CBC, PLATELET, NO DIFFERENTIAL: RBC: 4.23 X10E6/UL (ref 3.77–5.28)
CBC, PLATELET, NO DIFFERENTIAL: RDW: 15.7 % — HIGH (ref 11.7–15.4)
CBC, PLATELET, NO DIFFERENTIAL: WBC: 5 X10E3/UL (ref 3.4–10.8)
COMP. METABOLIC PANEL (14): A/G RATIO: 1.7 (ref 1.2–2.2)
COMP. METABOLIC PANEL (14): ALBUMIN: 4.7 G/DL (ref 3.8–4.8)
COMP. METABOLIC PANEL (14): ALKALINE PHOSPHATASE: 109 IU/L (ref 39–117)
COMP. METABOLIC PANEL (14): ALT (SGPT): 58 IU/L — HIGH (ref 0–32)
COMP. METABOLIC PANEL (14): AST (SGOT): 61 IU/L — HIGH (ref 0–40)
COMP. METABOLIC PANEL (14): BILIRUBIN, TOTAL: 0.3 MG/DL (ref 0–1.2)
COMP. METABOLIC PANEL (14): BUN/CREATININE RATIO: 12 (ref 9–23)
COMP. METABOLIC PANEL (14): BUN: 9 MG/DL (ref 6–24)
COMP. METABOLIC PANEL (14): CALCIUM: 10 MG/DL (ref 8.7–10.2)
COMP. METABOLIC PANEL (14): CARBON DIOXIDE, TOTAL: 24 MMOL/L (ref 20–29)
COMP. METABOLIC PANEL (14): CHLORIDE: 100 MMOL/L (ref 96–106)
COMP. METABOLIC PANEL (14): CREATININE: 0.73 MG/DL (ref 0.57–1)
COMP. METABOLIC PANEL (14): EGFR IF AFRICN AM: 116 ML/MIN/1.73 (ref 59–?)
COMP. METABOLIC PANEL (14): EGFR IF NONAFRICN AM: 100 ML/MIN/1.73 (ref 59–?)
COMP. METABOLIC PANEL (14): GLOBULIN, TOTAL: 2.8 G/DL (ref 1.5–4.5)
COMP. METABOLIC PANEL (14): GLUCOSE: 80 MG/DL (ref 65–99)
COMP. METABOLIC PANEL (14): POTASSIUM: 4.4 MMOL/L (ref 3.5–5.2)
COMP. METABOLIC PANEL (14): PROTEIN, TOTAL: 7.5 G/DL (ref 6–8.5)
COMP. METABOLIC PANEL (14): SODIUM: 139 MMOL/L (ref 134–144)
FE+TIBC+FER: FERRITIN, SERUM: 224 NG/ML — HIGH (ref 15–150)
FE+TIBC+FER: IRON BIND.CAP.(TIBC): 442 UG/DL (ref 250–450)
FE+TIBC+FER: IRON SATURATION: 10 % — LOW (ref 15–55)
FE+TIBC+FER: IRON: 46 UG/DL (ref 27–159)
FE+TIBC+FER: UIBC: 396 UG/DL (ref 131–425)
SEDIMENTATION RATE-WESTERGREN: 29 MM/HR (ref 0–32)
VITAMIN B12 AND FOLATE: FOLATE (FOLIC ACID), SERUM: 11.4 NG/ML (ref 3–?)
VITAMIN B12 AND FOLATE: VITAMIN B12: 1232 PG/ML (ref 232–1245)

## 2020-01-31 PROCEDURE — 99214 OFFICE O/P EST MOD 30 MIN: CPT | Performed by: INTERNAL MEDICINE

## 2020-01-31 NOTE — SERVICEHPINOTES
44-year-old  black female with long-standing complicated Crohn's disease since 2007 returns for routine office visit.  She saw Magui Cotton NP last July.  EGD 9/25/19 found duodenal ulcers.  Biopsies were unremarkable (no Crohn's).  Her previous colonoscopies 9/25/19 and 3/27/10 found active Crohn's disease at her ileocolonic anastomosis. There was also an anastomotic stricture in the sigmoid colon.  Routine lab 12/20/19 found AST=49, ALT=53.  CBC, ESR and CRP were unremarkable. Twyla has been on multiple medical treatments in the past, including biologics (Humira, Cimzia, Remicade and Stelara), antibiotics (Cipro), immunosuppressants (azathioprine), Entocort and corticosteroids. She has been on TPN and IV iron infusions.  She switched from Stelara to Entyvio in August 2018.  We don't have a recent Vedolizumab drug level.  Dexilant is controlling her reflux symptoms.  She takes Celebrex as needed for low back pain.  Currently, she is having 5-7 BM/d.  She feels lightheaded, especially after her menses.  She's been on iron supplementation and had a moderate anemia (Hct=28.1% on 10/17/19), her last CBC 12/20/19 was normal.  She has a long history of complicated crystallizing Crohn's disease with bowel obstructions and abscesses.  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 Dr. Lawson was unsuccessful enclosing this with OTS Resolution and Ovesco clips. She apparently had a postop sterile seroma documented on CT abdomen/pelvis, 7/10/18 which required drainage with a woundvac for a time. She has no previous history of ankylosing spondylitis and Spine Lumbar 2/3 on 2/1/19 was normal. She underwent ileocolonic resection 10/25/17 by Stephen Behrman, MD for active Crohn's with fistula and abscess. CT abdomen/pelvis 9/17/17 found a complex multifocal abscesses (8.5 cm) and fistulas abscesses. She continued to have problems and she was referred to [facility]. She saw BROOKLYN Valdez MD 12/11/17 who recommended a switch from Humira to Stelara. Azathioprine was stopped. She takes Cipro prn, low-dose prednisone and probiotics. No active Crohn's disease was apparent on either flexible sigmoidoscopies with her endoclips. Her last CT abdomen/pelvis 2/21/18 found "no significant change in appearance of right lower quadrant abscess cavity with fistulous connection to the sigmoid colon. Contrast from recent abscessogram clearly communicates with the sigmoid colon via the known fistula." FHx is negative for IBD or colon neoplasm.

## 2020-02-13 ENCOUNTER — OFFICE (OUTPATIENT)
Dept: URBAN - METROPOLITAN AREA CLINIC 11 | Facility: CLINIC | Age: 45
End: 2020-02-13

## 2020-02-13 DIAGNOSIS — K50.913 CROHN'S DISEASE, UNSPECIFIED, WITH FISTULA: ICD-10-CM

## 2020-02-13 PROCEDURE — 96413 CHEMO IV INFUSION 1 HR: CPT | Performed by: INTERNAL MEDICINE

## 2020-03-05 ENCOUNTER — OFFICE (OUTPATIENT)
Dept: URBAN - METROPOLITAN AREA CLINIC 19 | Facility: CLINIC | Age: 45
End: 2020-03-05

## 2020-03-10 ENCOUNTER — OFFICE (OUTPATIENT)
Dept: URBAN - METROPOLITAN AREA CLINIC 11 | Facility: CLINIC | Age: 45
End: 2020-03-10

## 2020-03-10 DIAGNOSIS — D50.9 IRON DEFICIENCY ANEMIA, UNSPECIFIED: ICD-10-CM

## 2020-03-10 DIAGNOSIS — T45.4X5A ADVERSE EFFECT OF IRON AND ITS COMPOUNDS, INITIAL ENCOUNTER: ICD-10-CM

## 2020-03-10 PROCEDURE — 96365 THER/PROPH/DIAG IV INF INIT: CPT | Performed by: INTERNAL MEDICINE

## 2020-03-17 ENCOUNTER — OFFICE (OUTPATIENT)
Dept: URBAN - METROPOLITAN AREA CLINIC 11 | Facility: CLINIC | Age: 45
End: 2020-03-17
Payer: MEDICAID

## 2020-03-17 DIAGNOSIS — D50.9 IRON DEFICIENCY ANEMIA, UNSPECIFIED: ICD-10-CM

## 2020-03-17 DIAGNOSIS — T45.4X5A ADVERSE EFFECT OF IRON AND ITS COMPOUNDS, INITIAL ENCOUNTER: ICD-10-CM

## 2020-03-17 PROCEDURE — 96365 THER/PROPH/DIAG IV INF INIT: CPT | Performed by: INTERNAL MEDICINE

## 2020-04-22 ENCOUNTER — OFFICE (OUTPATIENT)
Dept: URBAN - METROPOLITAN AREA CLINIC 11 | Facility: CLINIC | Age: 45
End: 2020-04-22
Payer: MEDICAID

## 2020-04-22 DIAGNOSIS — K50.913 CROHN'S DISEASE, UNSPECIFIED, WITH FISTULA: ICD-10-CM

## 2020-04-22 PROCEDURE — 96413 CHEMO IV INFUSION 1 HR: CPT | Performed by: INTERNAL MEDICINE

## 2020-06-03 ENCOUNTER — OFFICE (OUTPATIENT)
Dept: URBAN - METROPOLITAN AREA CLINIC 11 | Facility: CLINIC | Age: 45
End: 2020-06-03
Payer: MEDICAID

## 2020-06-03 DIAGNOSIS — K50.913 CROHN'S DISEASE, UNSPECIFIED, WITH FISTULA: ICD-10-CM

## 2020-06-03 PROCEDURE — 96413 CHEMO IV INFUSION 1 HR: CPT | Performed by: INTERNAL MEDICINE

## 2020-07-13 ENCOUNTER — OFFICE (OUTPATIENT)
Dept: URBAN - METROPOLITAN AREA CLINIC 11 | Facility: CLINIC | Age: 45
End: 2020-07-13
Payer: MEDICAID

## 2020-07-13 DIAGNOSIS — K50.913 CROHN'S DISEASE, UNSPECIFIED, WITH FISTULA: ICD-10-CM

## 2020-07-13 PROCEDURE — 96413 CHEMO IV INFUSION 1 HR: CPT | Performed by: INTERNAL MEDICINE

## 2020-07-15 ENCOUNTER — OFFICE (OUTPATIENT)
Dept: URBAN - METROPOLITAN AREA CLINIC 19 | Facility: CLINIC | Age: 45
End: 2020-07-15
Payer: MEDICAID

## 2020-07-15 DIAGNOSIS — K50.913 CROHN'S DISEASE, UNSPECIFIED, WITH FISTULA: ICD-10-CM

## 2020-07-15 PROCEDURE — 99214 OFFICE O/P EST MOD 30 MIN: CPT | Mod: 25

## 2020-07-23 ENCOUNTER — OFFICE (OUTPATIENT)
Dept: URBAN - METROPOLITAN AREA CLINIC 11 | Facility: CLINIC | Age: 45
End: 2020-07-23
Payer: MEDICAID

## 2020-07-23 DIAGNOSIS — D50.9 IRON DEFICIENCY ANEMIA, UNSPECIFIED: ICD-10-CM

## 2020-07-23 PROCEDURE — 96365 THER/PROPH/DIAG IV INF INIT: CPT | Performed by: INTERNAL MEDICINE

## 2020-07-30 ENCOUNTER — OFFICE (OUTPATIENT)
Dept: URBAN - METROPOLITAN AREA CLINIC 11 | Facility: CLINIC | Age: 45
End: 2020-07-30
Payer: MEDICAID

## 2020-07-30 DIAGNOSIS — D50.9 IRON DEFICIENCY ANEMIA, UNSPECIFIED: ICD-10-CM

## 2020-07-30 PROCEDURE — 96365 THER/PROPH/DIAG IV INF INIT: CPT | Performed by: INTERNAL MEDICINE

## 2020-08-27 ENCOUNTER — OFFICE (OUTPATIENT)
Dept: URBAN - METROPOLITAN AREA CLINIC 11 | Facility: CLINIC | Age: 45
End: 2020-08-27
Payer: MEDICAID

## 2020-08-27 DIAGNOSIS — K50.913 CROHN'S DISEASE, UNSPECIFIED, WITH FISTULA: ICD-10-CM

## 2020-08-27 PROCEDURE — 96413 CHEMO IV INFUSION 1 HR: CPT | Performed by: INTERNAL MEDICINE

## 2020-09-01 ENCOUNTER — OFFICE (OUTPATIENT)
Dept: URBAN - METROPOLITAN AREA CLINIC 19 | Facility: CLINIC | Age: 45
End: 2020-09-01

## 2020-09-28 ENCOUNTER — OFFICE (OUTPATIENT)
Dept: URBAN - METROPOLITAN AREA CLINIC 19 | Facility: CLINIC | Age: 45
End: 2020-09-28
Payer: MEDICAID

## 2020-09-28 VITALS
OXYGEN SATURATION: 98 % | HEIGHT: 67 IN | SYSTOLIC BLOOD PRESSURE: 125 MMHG | HEART RATE: 67 BPM | DIASTOLIC BLOOD PRESSURE: 85 MMHG | WEIGHT: 192 LBS

## 2020-09-28 DIAGNOSIS — K21.9 GASTRO-ESOPHAGEAL REFLUX DISEASE WITHOUT ESOPHAGITIS: ICD-10-CM

## 2020-09-28 DIAGNOSIS — K50.913 CROHN'S DISEASE, UNSPECIFIED, WITH FISTULA: ICD-10-CM

## 2020-09-28 DIAGNOSIS — M19.90 UNSPECIFIED OSTEOARTHRITIS, UNSPECIFIED SITE: ICD-10-CM

## 2020-09-28 PROCEDURE — 99214 OFFICE O/P EST MOD 30 MIN: CPT | Performed by: INTERNAL MEDICINE

## 2020-10-08 ENCOUNTER — OFFICE (OUTPATIENT)
Dept: URBAN - METROPOLITAN AREA CLINIC 11 | Facility: CLINIC | Age: 45
End: 2020-10-08
Payer: COMMERCIAL

## 2020-10-08 DIAGNOSIS — K50.913 CROHN'S DISEASE, UNSPECIFIED, WITH FISTULA: ICD-10-CM

## 2020-10-08 PROCEDURE — 96413 CHEMO IV INFUSION 1 HR: CPT | Performed by: INTERNAL MEDICINE

## 2020-11-12 ENCOUNTER — OFFICE (OUTPATIENT)
Dept: URBAN - METROPOLITAN AREA CLINIC 11 | Facility: CLINIC | Age: 45
End: 2020-11-12
Payer: MEDICAID

## 2020-11-12 DIAGNOSIS — T45.4X5A ADVERSE EFFECT OF IRON AND ITS COMPOUNDS, INITIAL ENCOUNTER: ICD-10-CM

## 2020-11-12 DIAGNOSIS — D50.9 IRON DEFICIENCY ANEMIA, UNSPECIFIED: ICD-10-CM

## 2020-11-12 PROCEDURE — 96365 THER/PROPH/DIAG IV INF INIT: CPT | Performed by: INTERNAL MEDICINE

## 2020-11-19 ENCOUNTER — OFFICE (OUTPATIENT)
Dept: URBAN - METROPOLITAN AREA CLINIC 11 | Facility: CLINIC | Age: 45
End: 2020-11-19
Payer: COMMERCIAL

## 2020-11-19 ENCOUNTER — OFFICE (OUTPATIENT)
Dept: URBAN - METROPOLITAN AREA CLINIC 19 | Facility: CLINIC | Age: 45
End: 2020-11-19
Payer: COMMERCIAL

## 2020-11-19 VITALS
OXYGEN SATURATION: 99 % | WEIGHT: 191 LBS | SYSTOLIC BLOOD PRESSURE: 130 MMHG | DIASTOLIC BLOOD PRESSURE: 84 MMHG | HEART RATE: 71 BPM | HEIGHT: 67 IN

## 2020-11-19 DIAGNOSIS — T45.4X5A ADVERSE EFFECT OF IRON AND ITS COMPOUNDS, INITIAL ENCOUNTER: ICD-10-CM

## 2020-11-19 DIAGNOSIS — D50.9 IRON DEFICIENCY ANEMIA, UNSPECIFIED: ICD-10-CM

## 2020-11-19 DIAGNOSIS — K50.913 CROHN'S DISEASE, UNSPECIFIED, WITH FISTULA: ICD-10-CM

## 2020-11-19 PROCEDURE — 99214 OFFICE O/P EST MOD 30 MIN: CPT | Mod: 25

## 2020-11-19 PROCEDURE — 96413 CHEMO IV INFUSION 1 HR: CPT | Performed by: INTERNAL MEDICINE

## 2020-11-19 PROCEDURE — 96365 THER/PROPH/DIAG IV INF INIT: CPT | Mod: 59 | Performed by: INTERNAL MEDICINE

## 2020-11-19 RX ORDER — DEXLANSOPRAZOLE 60 MG/1
60 CAPSULE, DELAYED RELEASE ORAL
Qty: 30 | Refills: 11 | Status: COMPLETED
End: 2022-09-28

## 2020-11-19 RX ORDER — FERROUS SULFATE 325(65) MG
650 TABLET ORAL
Qty: 60 | Refills: 11 | Status: ACTIVE

## 2020-11-19 RX ORDER — SULFASALAZINE 500 MG/1
1500 TABLET ORAL
Qty: 90 | Refills: 11 | Status: COMPLETED
Start: 2020-09-30 | End: 2021-08-11

## 2020-11-19 NOTE — SERVICENOTES
The patient's assessment was reviewed with Dr. Quiñones and a collaborative plan of care was established.

## 2020-11-19 NOTE — SERVICEHPINOTES
45-year-old  black female with long-standing complicated Crohn's disease since 2007 returns for routine follow-up of her IBD-associated arthritis.She saw Oren Quiñones MD on 9/28/20.  She was doing relatively well at that time, but her main complaint was arthritis.  Low-dose Celebrex was recommended by rheumatologist, Ada Polk MD.  She did feel like this was giving her much relief.  So we did start her on sulfasalazine 500 mg TID.  She reports moderate improvement in her symptoms and pain with this.  She is still taking the Celebrex on occasion.  Her reflux is well controlled on Dexilant 60 mg daily.  She takes Lomotil every few days as needed for diarrhea, but her bowel movements have been very manageable recently.Most recent lab work on 8/27/20 was unremarkable (no CMP reported). Vedolizumab drug concentration was relatively low at 6.0 (no Antibodies detected). Entyvio drug level on 3/17/20 was significantly low [3.1 (therapeutic goal >14)], but no antibodies. She was increased from infusions every 8 weeks to every 6 weeks. We recommended she try decreasing Entyvio infusion intervals to every 4 weeks, but her work schedule does not allow this.EGD 9/25/19 found duodenal ulcers. Biopsies were unremarkable (no Crohn's). Her previous colonoscopies 9/25/19 and 3/27/10 found active Crohn's disease at her ileocolonic anastomosis. There was also an anastomotic stricture in the sigmoid colon. Routine lab 12/20/19 found AST=49, ALT=53. CBC, ESR and CRP were unremarkable. Twyla has been on multiple medical treatments in the past, including biologics (Humira, Cimzia, Remicade and Stelara), antibiotics (Cipro), immunosuppressants (azathioprine), Entocort and corticosteroids. She has been on TPN and IV iron infusions. She switched from Stelara to Entyvio in August 2018. Dexilant is controlling her reflux symptoms. She takes Celebrex as needed for low back pain. She has a long history of complicated fistuliziing Crohn's disease with bowel obstructions and abscesses. 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 Dr. Lawson was unsuccessful enclosing this with OTS Resolution and Ovesco clips. She apparently had a postop sterile seroma documented on CT abdomen/pelvis, 7/10/18 which required drainage with a woundvac for a time. She has no previous history of ankylosing spondylitis and lumbar spine x-ray 2/1/19 was normal. She underwent ileocolonic resection 10/25/17 by Stephen Behrman, MD for active Crohn's with fistula and abscess. CT abd/pelvis 9/17/17 found a complex multifocal abscesses (8.5 cm) and fistulas abscesses. She continued to have problems and she was referred to [facility]. She saw BROOKLYN Valdez MD 12/11/17 who recommended a switch from Humira to Stelara. Azathioprine was stopped. No active Crohn's disease was apparent on either flexible sigmoidoscopies with her endoclips. Her last CT abdomen/pelvis 2/21/18 found "no significant change in appearance of right lower quadrant abscess cavity with fistulous connection to the sigmoid colon. Contrast from recent abscessogram clearly communicates with the sigmoid colon via the known fistula." FHx is negative for IBD or colon neoplasm.

## 2020-12-30 ENCOUNTER — OFFICE (OUTPATIENT)
Dept: URBAN - METROPOLITAN AREA CLINIC 11 | Facility: CLINIC | Age: 45
End: 2020-12-30
Payer: COMMERCIAL

## 2020-12-30 DIAGNOSIS — K50.90 CROHN'S DISEASE, UNSPECIFIED, WITHOUT COMPLICATIONS: ICD-10-CM

## 2020-12-30 PROCEDURE — 96413 CHEMO IV INFUSION 1 HR: CPT | Performed by: INTERNAL MEDICINE

## 2021-02-25 ENCOUNTER — OFFICE (OUTPATIENT)
Dept: URBAN - METROPOLITAN AREA CLINIC 11 | Facility: CLINIC | Age: 46
End: 2021-02-25
Payer: MEDICAID

## 2021-02-25 DIAGNOSIS — K50.913 CROHN'S DISEASE, UNSPECIFIED, WITH FISTULA: ICD-10-CM

## 2021-02-25 PROCEDURE — 96413 CHEMO IV INFUSION 1 HR: CPT | Performed by: INTERNAL MEDICINE

## 2021-04-08 ENCOUNTER — OFFICE (OUTPATIENT)
Dept: URBAN - METROPOLITAN AREA CLINIC 11 | Facility: CLINIC | Age: 46
End: 2021-04-08
Payer: MEDICAID

## 2021-04-08 DIAGNOSIS — K50.913 CROHN'S DISEASE, UNSPECIFIED, WITH FISTULA: ICD-10-CM

## 2021-04-08 PROCEDURE — 96413 CHEMO IV INFUSION 1 HR: CPT | Performed by: INTERNAL MEDICINE

## 2021-05-20 ENCOUNTER — OFFICE (OUTPATIENT)
Dept: URBAN - METROPOLITAN AREA CLINIC 11 | Facility: CLINIC | Age: 46
End: 2021-05-20
Payer: MEDICAID

## 2021-05-20 DIAGNOSIS — K50.913 CROHN'S DISEASE, UNSPECIFIED, WITH FISTULA: ICD-10-CM

## 2021-05-20 PROCEDURE — 96413 CHEMO IV INFUSION 1 HR: CPT | Performed by: INTERNAL MEDICINE

## 2021-06-30 ENCOUNTER — OFFICE (OUTPATIENT)
Dept: URBAN - METROPOLITAN AREA CLINIC 11 | Facility: CLINIC | Age: 46
End: 2021-06-30
Payer: MEDICAID

## 2021-06-30 DIAGNOSIS — K50.913 CROHN'S DISEASE, UNSPECIFIED, WITH FISTULA: ICD-10-CM

## 2021-06-30 PROCEDURE — 96413 CHEMO IV INFUSION 1 HR: CPT | Performed by: INTERNAL MEDICINE

## 2021-08-11 ENCOUNTER — OFFICE (OUTPATIENT)
Dept: URBAN - METROPOLITAN AREA CLINIC 19 | Facility: CLINIC | Age: 46
End: 2021-08-11
Payer: MEDICAID

## 2021-08-11 ENCOUNTER — OFFICE (OUTPATIENT)
Dept: URBAN - METROPOLITAN AREA CLINIC 11 | Facility: CLINIC | Age: 46
End: 2021-08-11
Payer: MEDICAID

## 2021-08-11 VITALS
OXYGEN SATURATION: 100 % | HEIGHT: 67 IN | SYSTOLIC BLOOD PRESSURE: 130 MMHG | DIASTOLIC BLOOD PRESSURE: 81 MMHG | HEART RATE: 73 BPM | WEIGHT: 198 LBS

## 2021-08-11 DIAGNOSIS — K50.813 CROHN'S DISEASE OF BOTH SMALL AND LARGE INTESTINE WITH FISTU: ICD-10-CM

## 2021-08-11 DIAGNOSIS — M19.90 UNSPECIFIED OSTEOARTHRITIS, UNSPECIFIED SITE: ICD-10-CM

## 2021-08-11 DIAGNOSIS — K21.9 GASTRO-ESOPHAGEAL REFLUX DISEASE WITHOUT ESOPHAGITIS: ICD-10-CM

## 2021-08-11 DIAGNOSIS — R74.9 ABNORMAL SERUM ENZYME LEVEL, UNSPECIFIED: ICD-10-CM

## 2021-08-11 DIAGNOSIS — K50.913 CROHN'S DISEASE, UNSPECIFIED, WITH FISTULA: ICD-10-CM

## 2021-08-11 PROCEDURE — 96413 CHEMO IV INFUSION 1 HR: CPT | Performed by: INTERNAL MEDICINE

## 2021-08-11 PROCEDURE — 99214 OFFICE O/P EST MOD 30 MIN: CPT | Mod: 25

## 2021-08-11 RX ORDER — DEXLANSOPRAZOLE 60 MG/1
60 CAPSULE, DELAYED RELEASE ORAL
Qty: 30 | Refills: 11 | Status: COMPLETED
End: 2022-09-28

## 2021-08-11 RX ORDER — FERROUS SULFATE 325(65) MG
650 TABLET ORAL
Qty: 60 | Refills: 11 | Status: ACTIVE

## 2021-08-11 NOTE — SERVICEHPINOTES
46-year-old  black female with long-standing complicated Crohn's disease since 2007 returns for routine follow-up of her IBD-associated arthritis.She was last here on 11/19/20 for routine follow-up at that time.  She is doing relatively well.  Her Crohn's symptoms seem to be “somewhat” manageable on the Entyvio.  She has had significant issues with arthritis.  She has been on low-dose Celebrex before as it was recommended by her rheumatologist, Ada Polk MD.  This was apparently not giving her much relief, so we started her on sulfasalazine 500 mg TID, treating her suspected IBD-associated arthritis.  She initially seemed to have improvement in her arthritis symptoms and pain, but the sulfasalazine apparently started to cause headaches.  She has since discontinued the sulfasalazine and her rheumatologist has switched her from Celebrex to meloxicam.  This has somewhat improved her arthritis pain, but can cause liver dysfunction.  Her reflux is well controlled on Dexilant 60 milligrams daily.  She does take Lomotil every few days as needed for diarrhea.  She denies dysphagia or overt GI bleeding.She has had chronic liver dysfunction for the last 3 years, which correlates when she 1st was started on Entyvio.  Her last normal liver function tests was 8/14/18.  Since that time her liver tests have been consistently elevated (AST=67-->53-->49-->61-->54-->45-->43 ALT=52-->38-->53-->58-->51-->41-->44).  Meloxicam, which was recently started by her rheumatologist, can also cause liver dysfunction.  Vedolizumab drug concentration 8/27/20 was relatively low at 6.0 (no Antibodies detected). Entyvio drug level on 3/17/20 was significantly low [3.1 (therapeutic goal >14)], but no antibodies. She was increased from infusions every 8 weeks to every 6 weeks. We recommended she try decreasing Entyvio infusion intervals to every 4 weeks, but her work schedule, initially, did not allow this. However, we recently increased infusions in March to every 6 weeks. She reports some mild improvement in her Crohn's symptoms.EGD 9/25/19 found duodenal ulcers. Biopsies were unremarkable (no Crohn's). Her previous colonoscopies 9/25/19 and 3/27/10 found active Crohn's disease at her ileocolonic anastomosis. There was also an anastomotic stricture in the sigmoid colon. Routine lab 12/20/19 found AST=49, ALT=53. CBC, ESR and CRP were unremarkable. Twyla has been on multiple medical treatments in the past, including biologics (Humira, Cimzia, Remicade and Stelara), antibiotics (Cipro), immunosuppressants (azathioprine), Entocort and corticosteroids. She has been on TPN and IV iron infusions. She switched from Stelara to Entyvio in August 2018. Dexilant is controlling her reflux symptoms.  She has a long history of complicated fistuliziing Crohn's disease with bowel obstructions and abscesses. 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 Dr. Lawson was unsuccessful enclosing this with OTS Resolution and Ovesco clips. She apparently had a postop sterile seroma documented on CT abdomen/pelvis, 7/10/18 which required drainage with a woundvac for a time. She has no previous history of ankylosing spondylitis and lumbar spine x-ray 2/1/19 was normal. She underwent ileocolonic resection 10/25/17 by Stephen Behrman, MD for active Crohn's with fistula and abscess. CT abd/pelvis 9/17/17 found a complex multifocal abscesses (8.5 cm) and fistulas abscesses. She continued to have problems and she was referred to [facility]. She saw BROOKLYN Valdez MD 12/11/17 who recommended a switch from Humira to Stelara. Azathioprine was stopped. No active Crohn's disease was apparent on either flexible sigmoidoscopies with her endoclips. Her last CT abdomen/pelvis 2/21/18 found "no significant change in appearance of right lower quadrant abscess cavity with fistulous connection to the sigmoid colon. Contrast from recent abscessogram clearly communicates with the sigmoid colon via the known fistula." FHx is negative for IBD or colon neoplasm.

## 2021-08-12 PROBLEM — T45.4X5A IDA: Status: ACTIVE | Noted: 2019-10-24

## 2021-08-12 PROBLEM — K63.3 ULCER OF INTESTINE: Status: ACTIVE | Noted: 2019-09-25

## 2021-08-12 PROBLEM — K20.9 ESOPHAGITIS, UNSPECIFIED: Status: ACTIVE | Noted: 2019-09-25

## 2021-08-12 PROBLEM — K26.9 DUODENAL ULCER, UNSP AS ACUTE OR CHRONIC, W/O HEMOR OR PERF: Status: ACTIVE | Noted: 2019-09-25

## 2021-09-23 ENCOUNTER — OFFICE (OUTPATIENT)
Dept: URBAN - METROPOLITAN AREA CLINIC 11 | Facility: CLINIC | Age: 46
End: 2021-09-23
Payer: MEDICAID

## 2021-09-23 DIAGNOSIS — K50.913 CROHN'S DISEASE, UNSPECIFIED, WITH FISTULA: ICD-10-CM

## 2021-09-23 PROCEDURE — 96413 CHEMO IV INFUSION 1 HR: CPT | Performed by: INTERNAL MEDICINE

## 2021-10-20 ENCOUNTER — OFFICE (OUTPATIENT)
Dept: URBAN - METROPOLITAN AREA CLINIC 11 | Facility: CLINIC | Age: 46
End: 2021-10-20
Payer: MEDICAID

## 2021-10-20 DIAGNOSIS — K50.813 CROHN'S DISEASE OF BOTH SMALL AND LARGE INTESTINE WITH FISTU: ICD-10-CM

## 2021-10-20 PROCEDURE — 96413 CHEMO IV INFUSION 1 HR: CPT | Performed by: INTERNAL MEDICINE

## 2022-07-15 ENCOUNTER — OFFICE (OUTPATIENT)
Dept: URBAN - METROPOLITAN AREA CLINIC 19 | Facility: CLINIC | Age: 47
End: 2022-07-15
Payer: MEDICAID

## 2022-07-15 VITALS
OXYGEN SATURATION: 96 % | DIASTOLIC BLOOD PRESSURE: 79 MMHG | WEIGHT: 191 LBS | HEART RATE: 84 BPM | SYSTOLIC BLOOD PRESSURE: 124 MMHG | HEIGHT: 67 IN

## 2022-07-15 DIAGNOSIS — T45.4X5A ADVERSE EFFECT OF IRON AND ITS COMPOUNDS, INITIAL ENCOUNTER: ICD-10-CM

## 2022-07-15 DIAGNOSIS — M19.90 UNSPECIFIED OSTEOARTHRITIS, UNSPECIFIED SITE: ICD-10-CM

## 2022-07-15 DIAGNOSIS — K21.9 GASTRO-ESOPHAGEAL REFLUX DISEASE WITHOUT ESOPHAGITIS: ICD-10-CM

## 2022-07-15 DIAGNOSIS — R94.5 ABNORMAL RESULTS OF LIVER FUNCTION STUDIES: ICD-10-CM

## 2022-07-15 LAB
C-REACTIVE PROTEIN, QUANT: 18 MG/L — HIGH (ref 0–10)
FE+TIBC+FER: FERRITIN: 22 NG/ML (ref 15–150)
FE+TIBC+FER: IRON BIND.CAP.(TIBC): 477 UG/DL — HIGH (ref 250–450)
FE+TIBC+FER: IRON SATURATION: 5 % — CRITICAL LOW (ref 15–55)
FE+TIBC+FER: IRON: 22 UG/DL — LOW (ref 27–159)
FE+TIBC+FER: UIBC: 455 UG/DL — HIGH (ref 131–425)
SEDIMENTATION RATE-WESTERGREN: 25 MM/HR (ref 0–32)
USTEKINUMAB DRUG + ANTIBODY: ANTI-USTEKINUMAB ANTIBODY: <40 NG/ML
USTEKINUMAB DRUG + ANTIBODY: USTEKINUMAB: 0.6 UG/ML
VITAMIN B12: 174 PG/ML — LOW (ref 232–1245)
VITAMIN D, 25-HYDROXY: 26.5 NG/ML — LOW (ref 30–100)

## 2022-07-15 PROCEDURE — 99214 OFFICE O/P EST MOD 30 MIN: CPT

## 2022-07-15 RX ORDER — DEXLANSOPRAZOLE 60 MG/1
60 CAPSULE, DELAYED RELEASE ORAL
Qty: 30 | Refills: 11 | Status: COMPLETED
End: 2022-09-28

## 2022-07-15 NOTE — SERVICEHPINOTES
46-year-old  black female with long-standing complicated Crohn's disease since 2007 returns for routine follow-up of her Crohn's and IBD-associated arthritis.She was last seen 8/11/21 for routine follow-up of her Crohn's as well as worsening IBD associated arthritis. Her Crohn's symptoms seemed to be “somewhat” manageable on the Entyvio.  She had had significant issues with arthritis, however.  She had been on low-dose Celebrex before, as it was recommended by her rheumatologist, Ada Polk MD.  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  She initially seemed to have improvement in her arthritis symptoms and pain, but the sulfasalazine apparently started to cause headaches.  She discontinued the sulfasalazine and her rheumatologist had switched her from Celebrex to meloxicam.  This somewhat improved her arthritis pain, but was causing liver dysfunction.   Routine lab work 8/11/21 found mildly elevated CRP ( 18) and trivial elevation of ALT ( 40).  We discussed doing an AUS, but she was not interested in having this test done.  We ended up switching her from Entyvio to Stelara, as this covered her Crohn's disease as well as her IBD associated arthritis.  This was initiated on 10/19/21.
br
br  She returns today for routine follow-up of her symptoms and reports improvement in her Crohn's symptoms as well as significant improvement in her arthritis.  She has occasional breakthrough Crohn's symptoms, but for the most part she denies abdominal pain, change in bowel habit or overt GI bleeding. Her reflux is well controlled on Dexilant 60 milligrams daily.  She does take Lomotil every few days as needed for diarrhea.  She denies dysphagia or overt GI bleeding.  Apparently routine blood work with her PCP earlier this spring found normal liver enzymes (records requested).She has had chronic liver dysfunction for the last 3 years, which correlates when she 1st was started on Entyvio.  Her last normal liver function tests was 8/14/18.  Since that time her liver tests have been consistently elevated (AST=67-->53-->49-->61-->54-->45-->43 ALT=52-->38-->53-->58-->51-->41-->44).  Meloxicam, which was recently started by her rheumatologist, can also cause liver dysfunction.  Vedolizumab drug concentration 8/27/20 was relatively low at 6.0 (no Antibodies detected). Entyvio drug level on 3/17/20 was significantly low [3.1 (therapeutic goal >14)], but no antibodies. She was increased from infusions every 8 weeks to every 6 weeks. We recommended she try decreasing Entyvio infusion intervals to every 4 weeks, but her work schedule, initially, did not allow this. However, we recently increased infusions in March to every 6 weeks. She reports some mild improvement in her Crohn's symptoms.EGD 9/25/19 found duodenal ulcers. Biopsies were unremarkable (no Crohn's). Her previous colonoscopies 9/25/19 and 3/27/10 found active Crohn's disease at her ileocolonic anastomosis. There was also an anastomotic stricture in the sigmoid colon. Routine lab 12/20/19 found AST=49, ALT=53. CBC, ESR and CRP were unremarkable.Twyla has been on multiple medical treatments in the past, including biologics (Humira, Cimzia, Remicade and Stelara), antibiotics (Cipro), immunosuppressants (azathioprine), Entocort and corticosteroids. She has been on TPN and IV iron infusions. She switched from Stelara to Entyvio in August 2018. Dexilant is controlling her reflux symptoms. She has a long history of complicated fistuliziing Crohn's disease with bowel obstructions and abscesses. 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 Dr. Lawson was unsuccessful enclosing this with OTS Resolution and Ovesco clips. She apparently had a postop sterile seroma documented on CT abdomen/pelvis, 7/10/18 which required drainage with a woundvac for a time. She has no previous history of ankylosing spondylitis and lumbar spine x-ray 2/1/19 was normal.She underwent ileocolonic resection 10/25/17 by Stephen Behrman, MD for active Crohn's with fistula and abscess. CT abd/pelvis 9/17/17 found a complex multifocal abscesses (8.5 cm) and fistulas abscesses. She continued to have problems and she was referred to [facility]. She saw BROOKLYN Valdez MD 12/11/17 who recommended a switch from Humira to Stelara. Azathioprine was stopped. No active Crohn's disease was apparent on either flexible sigmoidoscopies with her endoclips. Her last CT abdomen/pelvis 2/21/18 found "no significant change in appearance of right lower quadrant abscess cavity with fistulous connection to the sigmoid colon. Contrast from recent abscessogram clearly communicates with the sigmoid colon via the known fistula."FHx is negative for IBD or colon neoplasm.

## 2022-07-22 ENCOUNTER — OFFICE (OUTPATIENT)
Dept: URBAN - METROPOLITAN AREA CLINIC 11 | Facility: CLINIC | Age: 47
End: 2022-07-22
Payer: COMMERCIAL

## 2022-07-22 VITALS
DIASTOLIC BLOOD PRESSURE: 70 MMHG | TEMPERATURE: 97.1 F | HEART RATE: 76 BPM | SYSTOLIC BLOOD PRESSURE: 113 MMHG | TEMPERATURE: 98.1 F | SYSTOLIC BLOOD PRESSURE: 127 MMHG | HEIGHT: 67 IN | RESPIRATION RATE: 18 BRPM | DIASTOLIC BLOOD PRESSURE: 78 MMHG | HEART RATE: 81 BPM | HEART RATE: 80 BPM | SYSTOLIC BLOOD PRESSURE: 123 MMHG | DIASTOLIC BLOOD PRESSURE: 76 MMHG

## 2022-07-22 DIAGNOSIS — T45.4X5A ADVERSE EFFECT OF IRON AND ITS COMPOUNDS, INITIAL ENCOUNTER: ICD-10-CM

## 2022-07-22 DIAGNOSIS — D50.9 IRON DEFICIENCY ANEMIA, UNSPECIFIED: ICD-10-CM

## 2022-07-22 PROCEDURE — 96365 THER/PROPH/DIAG IV INF INIT: CPT

## 2022-07-22 NOTE — SERVICENOTES
Next Venofer infusion is on  7/25/22  at 9:30am
Patient observed for 15 minutes post infusion. 
Patient denies chest pain, shortness of breath or dizziness.  
Handout given and reviewed with patient.
Patient was instructed on common side effects.
Patient verbalized a complete understanding and has no questions.

## 2022-07-25 ENCOUNTER — OFFICE (OUTPATIENT)
Dept: URBAN - METROPOLITAN AREA CLINIC 11 | Facility: CLINIC | Age: 47
End: 2022-07-25
Payer: COMMERCIAL

## 2022-07-25 VITALS
DIASTOLIC BLOOD PRESSURE: 85 MMHG | SYSTOLIC BLOOD PRESSURE: 123 MMHG | HEART RATE: 70 BPM | HEART RATE: 80 BPM | HEIGHT: 67 IN | DIASTOLIC BLOOD PRESSURE: 76 MMHG | HEART RATE: 71 BPM | RESPIRATION RATE: 18 BRPM | SYSTOLIC BLOOD PRESSURE: 126 MMHG | SYSTOLIC BLOOD PRESSURE: 114 MMHG | DIASTOLIC BLOOD PRESSURE: 63 MMHG | TEMPERATURE: 98.1 F

## 2022-07-25 DIAGNOSIS — D50.9 IRON DEFICIENCY ANEMIA, UNSPECIFIED: ICD-10-CM

## 2022-07-25 DIAGNOSIS — K51.90 ULCERATIVE COLITIS, UNSPECIFIED, WITHOUT COMPLICATIONS: ICD-10-CM

## 2022-07-25 PROCEDURE — 96365 THER/PROPH/DIAG IV INF INIT: CPT

## 2022-07-27 ENCOUNTER — OFFICE (OUTPATIENT)
Dept: URBAN - METROPOLITAN AREA CLINIC 11 | Facility: CLINIC | Age: 47
End: 2022-07-27
Payer: COMMERCIAL

## 2022-07-27 VITALS
SYSTOLIC BLOOD PRESSURE: 111 MMHG | HEART RATE: 76 BPM | TEMPERATURE: 97.2 F | SYSTOLIC BLOOD PRESSURE: 132 MMHG | SYSTOLIC BLOOD PRESSURE: 113 MMHG | HEIGHT: 67 IN | HEART RATE: 77 BPM | TEMPERATURE: 97.7 F | DIASTOLIC BLOOD PRESSURE: 77 MMHG | RESPIRATION RATE: 18 BRPM | DIASTOLIC BLOOD PRESSURE: 76 MMHG | HEART RATE: 75 BPM

## 2022-07-27 DIAGNOSIS — T45.4X5A ADVERSE EFFECT OF IRON AND ITS COMPOUNDS, INITIAL ENCOUNTER: ICD-10-CM

## 2022-07-27 DIAGNOSIS — D50.9 IRON DEFICIENCY ANEMIA, UNSPECIFIED: ICD-10-CM

## 2022-07-27 PROCEDURE — 96365 THER/PROPH/DIAG IV INF INIT: CPT | Performed by: INTERNAL MEDICINE

## 2022-07-29 ENCOUNTER — OFFICE (OUTPATIENT)
Dept: URBAN - METROPOLITAN AREA CLINIC 11 | Facility: CLINIC | Age: 47
End: 2022-07-29
Payer: COMMERCIAL

## 2022-07-29 VITALS
SYSTOLIC BLOOD PRESSURE: 109 MMHG | HEIGHT: 67 IN | HEART RATE: 81 BPM | TEMPERATURE: 98.1 F | HEART RATE: 87 BPM | DIASTOLIC BLOOD PRESSURE: 63 MMHG | HEART RATE: 80 BPM | DIASTOLIC BLOOD PRESSURE: 69 MMHG | SYSTOLIC BLOOD PRESSURE: 115 MMHG | DIASTOLIC BLOOD PRESSURE: 72 MMHG | SYSTOLIC BLOOD PRESSURE: 120 MMHG | RESPIRATION RATE: 18 BRPM | TEMPERATURE: 97.6 F

## 2022-07-29 DIAGNOSIS — D50.9 IRON DEFICIENCY ANEMIA, UNSPECIFIED: ICD-10-CM

## 2022-07-29 DIAGNOSIS — T45.4X5A ADVERSE EFFECT OF IRON AND ITS COMPOUNDS, INITIAL ENCOUNTER: ICD-10-CM

## 2022-07-29 PROCEDURE — 96365 THER/PROPH/DIAG IV INF INIT: CPT | Performed by: INTERNAL MEDICINE

## 2022-08-03 ENCOUNTER — OFFICE (OUTPATIENT)
Dept: URBAN - METROPOLITAN AREA CLINIC 11 | Facility: CLINIC | Age: 47
End: 2022-08-03
Payer: COMMERCIAL

## 2022-08-03 VITALS
DIASTOLIC BLOOD PRESSURE: 69 MMHG | TEMPERATURE: 98.2 F | DIASTOLIC BLOOD PRESSURE: 81 MMHG | SYSTOLIC BLOOD PRESSURE: 134 MMHG | SYSTOLIC BLOOD PRESSURE: 130 MMHG | HEIGHT: 67 IN | DIASTOLIC BLOOD PRESSURE: 83 MMHG | HEART RATE: 83 BPM | SYSTOLIC BLOOD PRESSURE: 116 MMHG | HEART RATE: 75 BPM | HEART RATE: 79 BPM

## 2022-08-03 DIAGNOSIS — D50.9 IRON DEFICIENCY ANEMIA, UNSPECIFIED: ICD-10-CM

## 2022-08-03 DIAGNOSIS — T45.4X5A ADVERSE EFFECT OF IRON AND ITS COMPOUNDS, INITIAL ENCOUNTER: ICD-10-CM

## 2022-08-03 PROCEDURE — 96365 THER/PROPH/DIAG IV INF INIT: CPT | Performed by: INTERNAL MEDICINE

## 2022-08-03 PROCEDURE — 96372 THER/PROPH/DIAG INJ SC/IM: CPT | Mod: 59 | Performed by: INTERNAL MEDICINE

## 2022-09-28 ENCOUNTER — OFFICE (OUTPATIENT)
Dept: URBAN - METROPOLITAN AREA CLINIC 19 | Facility: CLINIC | Age: 47
End: 2022-09-28
Payer: MEDICAID

## 2022-09-28 VITALS
HEART RATE: 66 BPM | DIASTOLIC BLOOD PRESSURE: 76 MMHG | HEIGHT: 67 IN | WEIGHT: 189 LBS | OXYGEN SATURATION: 100 % | SYSTOLIC BLOOD PRESSURE: 122 MMHG

## 2022-09-28 DIAGNOSIS — K21.9 GASTRO-ESOPHAGEAL REFLUX DISEASE WITHOUT ESOPHAGITIS: ICD-10-CM

## 2022-09-28 DIAGNOSIS — M19.90 UNSPECIFIED OSTEOARTHRITIS, UNSPECIFIED SITE: ICD-10-CM

## 2022-09-28 DIAGNOSIS — R94.5 ABNORMAL RESULTS OF LIVER FUNCTION STUDIES: ICD-10-CM

## 2022-09-28 DIAGNOSIS — T45.4X5A ADVERSE EFFECT OF IRON AND ITS COMPOUNDS, INITIAL ENCOUNTER: ICD-10-CM

## 2022-09-28 PROCEDURE — 99214 OFFICE O/P EST MOD 30 MIN: CPT

## 2022-09-28 RX ORDER — PANTOPRAZOLE SODIUM 40 MG/1
40 TABLET, DELAYED RELEASE ORAL
Qty: 30 | Refills: 11 | Status: COMPLETED
Start: 2022-09-28 | End: 2022-12-09

## 2022-09-28 RX ORDER — DEXLANSOPRAZOLE 60 MG/1
60 CAPSULE, DELAYED RELEASE ORAL
Qty: 30 | Refills: 11 | Status: COMPLETED
End: 2022-09-28

## 2022-09-28 RX ORDER — DICYCLOMINE HYDROCHLORIDE 10 MG/1
CAPSULE ORAL
Qty: 90 | Refills: 3 | Status: ACTIVE
Start: 2022-09-28

## 2022-09-28 NOTE — SERVICEHPINOTES
47-year-old  black female with long-standing complicated Crohn's disease since 2007 returns for breakthrough GERD symptoms. She returns today for breakthrough symptoms of her reflux the last few weeks on Dexilant. She has been on this for a while now, but over the last few weeks she has had worsening symptoms of heartburn. She does admit to gaining weight over the last few months which likely has affected her symptoms. Her PCP gave her a prescription for Pantoprazole 40 mg for a few days which did seem to help more than the Dexilant. She is also having intermittent abdominal cramping and has used Bentyl in the past to help with this but she does not have the prescription anymore. Other than that, her Crohn's has been fairly well managed on the Stealar every 8 weeks.  
br
br  She was here 8/11/21 for routine follow-up of her Crohn's as well as worsening IBD associated arthritis. Her Crohn's symptoms seemed to be “somewhat” manageable on the Entyvio.  She had had significant issues with arthritis, however.  She had been on low-dose Celebrex before, as it was recommended by her rheumatologist, Ada Polk MD.  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  She initially seemed to have improvement in her arthritis symptoms and pain, but the sulfasalazine apparently started to cause headaches.  She discontinued the sulfasalazine and her rheumatologist had switched her from Celebrex to meloxicam.  This somewhat improved her arthritis pain, but was causing liver dysfunction.   Routine lab work 8/11/21 found mildly elevated CRP ( 18) and trivial elevation of ALT ( 40).  We discussed doing an AUS, but she was not interested in having this test done.  We ended up switching her from Entyvio to Stelara, as this covered her Crohn's disease as well as her IBD associated arthritis.  This was initiated on 10/19/21.  She returned 7/15/22 for routine follow-up of her symptoms and reports improvement in her Crohn's symptoms as well as significant improvement in her arthritis.  She has occasional breakthrough Crohn's symptoms, but for the most part she denies abdominal pain, change in bowel habit or overt GI bleeding. Her reflux is well controlled on Dexilant 60 milligrams daily.  She does take Lomotil every few days as needed for diarrhea.  She denies dysphagia or overt GI bleeding.  Routine lab was remarkable for iron of 22, iron saturation of 5%, Vit D of 26.5, Vitamin B12 of 174, and elevated CRP of 18. She has had five iron infusions since July. She has had chronic liver dysfunction for the last 3 years, which correlates when she 1st was started on Entyvio.  Her last normal liver function tests was 8/14/18.  Since that time her liver tests have been consistently elevated (AST=67-->53-->49-->61-->54-->45-->43 ALT=52-->38-->53-->58-->51-->41-->44).  Meloxicam, which was recently started by her rheumatologist, can also cause liver dysfunction.  Vedolizumab drug concentration 8/27/20 was relatively low at 6.0 (no Antibodies detected). Entyvio drug level on 3/17/20 was significantly low [3.1 (therapeutic goal >14)], but no antibodies. She was increased from infusions every 8 weeks to every 6 weeks. We recommended she try decreasing Entyvio infusion intervals to every 4 weeks, but her work schedule, initially, did not allow this. However, we recently increased infusions in March to every 6 weeks. She reports some mild improvement in her Crohn's symptoms.EGD 9/25/19 found duodenal ulcers. Biopsies were unremarkable (no Crohn's). Her previous colonoscopies 9/25/19 and 3/27/10 found active Crohn's disease at her ileocolonic anastomosis. There was also an anastomotic stricture in the sigmoid colon. Routine lab 12/20/19 found AST=49, ALT=53. CBC, ESR and CRP were unremarkable.Twyla has been on multiple medical treatments in the past, including biologics (Humira, Cimzia, Remicade and Stelara), antibiotics (Cipro), immunosuppressants (azathioprine), Entocort and corticosteroids. She has been on TPN and IV iron infusions. She switched from Stelara to Entyvio in August 2018. Dexilant is controlling her reflux symptoms. She has a long history of complicated fistuliziing Crohn's disease with bowel obstructions and abscesses. 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 Dr. Lawson was unsuccessful enclosing this with OTS Resolution and Ovesco clips. She apparently had a postop sterile seroma documented on CT abdomen/pelvis, 7/10/18 which required drainage with a woundvac for a time. She has no previous history of ankylosing spondylitis and lumbar spine x-ray 2/1/19 was normal.She underwent ileocolonic resection 10/25/17 by Stephen Behrman, MD for active Crohn's with fistula and abscess. CT abd/pelvis 9/17/17 found a complex multifocal abscesses (8.5 cm) and fistulas abscesses. She continued to have problems and she was referred to [facility]. She saw BROOKLYN Valdez MD 12/11/17 who recommended a switch from Humira to Stelara. Azathioprine was stopped. No active Crohn's disease was apparent on either flexible sigmoidoscopies with her endoclips. Her last CT abdomen/pelvis 2/21/18 found "no significant change in appearance of right lower quadrant abscess cavity with fistulous connection to the sigmoid colon. Contrast from recent abscessogram clearly communicates with the sigmoid colon via the known fistula."FHx is negative for IBD or colon neoplasm.

## 2022-12-09 ENCOUNTER — OFFICE (OUTPATIENT)
Dept: URBAN - METROPOLITAN AREA CLINIC 19 | Facility: CLINIC | Age: 47
End: 2022-12-09
Payer: COMMERCIAL

## 2022-12-09 VITALS
HEART RATE: 86 BPM | DIASTOLIC BLOOD PRESSURE: 82 MMHG | OXYGEN SATURATION: 100 % | SYSTOLIC BLOOD PRESSURE: 125 MMHG | HEIGHT: 67 IN | WEIGHT: 183 LBS

## 2022-12-09 DIAGNOSIS — R10.13 EPIGASTRIC PAIN: ICD-10-CM

## 2022-12-09 DIAGNOSIS — K50.818 CROHN'S DISEASE OF BOTH SMALL AND LARGE INTESTINE WITH OTHER: ICD-10-CM

## 2022-12-09 DIAGNOSIS — R19.7 DIARRHEA, UNSPECIFIED: ICD-10-CM

## 2022-12-09 DIAGNOSIS — K26.9 DUODENAL ULCER, UNSPECIFIED AS ACUTE OR CHRONIC, WITHOUT HEM: ICD-10-CM

## 2022-12-09 DIAGNOSIS — K21.9 GASTRO-ESOPHAGEAL REFLUX DISEASE WITHOUT ESOPHAGITIS: ICD-10-CM

## 2022-12-09 PROCEDURE — 99214 OFFICE O/P EST MOD 30 MIN: CPT | Performed by: INTERNAL MEDICINE

## 2022-12-15 ENCOUNTER — OFFICE (OUTPATIENT)
Dept: URBAN - METROPOLITAN AREA CLINIC 22 | Facility: CLINIC | Age: 47
End: 2022-12-15
Payer: COMMERCIAL

## 2022-12-15 DIAGNOSIS — R10.13 EPIGASTRIC PAIN: ICD-10-CM

## 2022-12-15 DIAGNOSIS — R19.7 DIARRHEA, UNSPECIFIED: ICD-10-CM

## 2022-12-15 DIAGNOSIS — K50.818 CROHN'S DISEASE OF BOTH SMALL AND LARGE INTESTINE WITH OTHER: ICD-10-CM

## 2022-12-15 PROCEDURE — 74177 CT ABD & PELVIS W/CONTRAST: CPT | Mod: TC | Performed by: INTERNAL MEDICINE

## 2022-12-19 LAB
C-REACTIVE PROTEIN, QUANT: 16 MG/L — HIGH (ref 0–10)
CBC, PLATELET, NO DIFFERENTIAL: HEMATOCRIT: 40.1 % (ref 34–46.6)
CBC, PLATELET, NO DIFFERENTIAL: HEMOGLOBIN: 12.4 G/DL (ref 11.1–15.9)
CBC, PLATELET, NO DIFFERENTIAL: MCH: 26.4 PG — LOW (ref 26.6–33)
CBC, PLATELET, NO DIFFERENTIAL: MCHC: 30.9 G/DL — LOW (ref 31.5–35.7)
CBC, PLATELET, NO DIFFERENTIAL: MCV: 86 FL (ref 79–97)
CBC, PLATELET, NO DIFFERENTIAL: NRBC: (no result)
CBC, PLATELET, NO DIFFERENTIAL: PLATELETS: 403 X10E3/UL (ref 150–450)
CBC, PLATELET, NO DIFFERENTIAL: RBC: 4.69 X10E6/UL (ref 3.77–5.28)
CBC, PLATELET, NO DIFFERENTIAL: RDW: 14.5 % (ref 11.7–15.4)
CBC, PLATELET, NO DIFFERENTIAL: WBC: 7.2 X10E3/UL (ref 3.4–10.8)
COMP. METABOLIC PANEL (14): A/G RATIO: 1.8 (ref 1.2–2.2)
COMP. METABOLIC PANEL (14): ALBUMIN: 4.7 G/DL (ref 3.8–4.8)
COMP. METABOLIC PANEL (14): ALKALINE PHOSPHATASE: 112 IU/L (ref 44–121)
COMP. METABOLIC PANEL (14): ALT (SGPT): 20 IU/L (ref 0–32)
COMP. METABOLIC PANEL (14): AST (SGOT): 16 IU/L (ref 0–40)
COMP. METABOLIC PANEL (14): BILIRUBIN, TOTAL: 0.4 MG/DL (ref 0–1.2)
COMP. METABOLIC PANEL (14): BUN/CREATININE RATIO: 15 (ref 9–23)
COMP. METABOLIC PANEL (14): BUN: 11 MG/DL (ref 6–24)
COMP. METABOLIC PANEL (14): CALCIUM: 10.1 MG/DL (ref 8.7–10.2)
COMP. METABOLIC PANEL (14): CARBON DIOXIDE, TOTAL: 26 MMOL/L (ref 20–29)
COMP. METABOLIC PANEL (14): CHLORIDE: 97 MMOL/L (ref 96–106)
COMP. METABOLIC PANEL (14): CREATININE: 0.75 MG/DL (ref 0.57–1)
COMP. METABOLIC PANEL (14): EGFR: 99 ML/MIN/1.73 (ref 59–?)
COMP. METABOLIC PANEL (14): GLOBULIN, TOTAL: 2.6 G/DL (ref 1.5–4.5)
COMP. METABOLIC PANEL (14): GLUCOSE: 92 MG/DL (ref 70–99)
COMP. METABOLIC PANEL (14): POTASSIUM: 4.8 MMOL/L (ref 3.5–5.2)
COMP. METABOLIC PANEL (14): PROTEIN, TOTAL: 7.3 G/DL (ref 6–8.5)
COMP. METABOLIC PANEL (14): SODIUM: 138 MMOL/L (ref 134–144)
FERRITIN: 243 NG/ML — HIGH (ref 15–150)
IRON AND TIBC: IRON BIND.CAP.(TIBC): 458 UG/DL — HIGH (ref 250–450)
IRON AND TIBC: IRON SATURATION: 9 % — CRITICAL LOW (ref 15–55)
IRON AND TIBC: IRON: 40 UG/DL (ref 27–159)
IRON AND TIBC: UIBC: 418 UG/DL (ref 131–425)
MAGNESIUM: 1.9 MG/DL (ref 1.6–2.3)
PDF REPORT: PDF REPORT1: (no result)
SEDIMENTATION RATE-WESTERGREN: 30 MM/HR (ref 0–32)
SERIAL MONITORING: PDF: (no result)
USTEKINUMAB DRUG + ANTIBODY: ANTI-USTEKINUMAB ANTIBODY: <40 NG/ML
USTEKINUMAB DRUG + ANTIBODY: USTEKINUMAB: 0.5 UG/ML

## 2022-12-20 LAB — CALPROTECTIN, FECAL: 475 UG/G — HIGH (ref 0–120)

## 2022-12-21 ENCOUNTER — AMBULATORY SURGICAL CENTER (OUTPATIENT)
Dept: URBAN - METROPOLITAN AREA SURGERY 2 | Facility: SURGERY | Age: 47
End: 2022-12-21
Payer: COMMERCIAL

## 2022-12-21 ENCOUNTER — OFFICE (OUTPATIENT)
Dept: URBAN - METROPOLITAN AREA PATHOLOGY 20 | Facility: PATHOLOGY | Age: 47
End: 2022-12-21
Payer: COMMERCIAL

## 2022-12-21 VITALS
TEMPERATURE: 98 F | SYSTOLIC BLOOD PRESSURE: 142 MMHG | HEART RATE: 93 BPM | SYSTOLIC BLOOD PRESSURE: 131 MMHG | SYSTOLIC BLOOD PRESSURE: 137 MMHG | WEIGHT: 183 LBS | HEART RATE: 93 BPM | DIASTOLIC BLOOD PRESSURE: 75 MMHG | DIASTOLIC BLOOD PRESSURE: 80 MMHG | SYSTOLIC BLOOD PRESSURE: 125 MMHG | OXYGEN SATURATION: 95 % | OXYGEN SATURATION: 96 % | HEIGHT: 67 IN | RESPIRATION RATE: 16 BRPM | DIASTOLIC BLOOD PRESSURE: 81 MMHG | DIASTOLIC BLOOD PRESSURE: 66 MMHG | HEART RATE: 97 BPM | SYSTOLIC BLOOD PRESSURE: 143 MMHG | SYSTOLIC BLOOD PRESSURE: 125 MMHG | DIASTOLIC BLOOD PRESSURE: 81 MMHG | SYSTOLIC BLOOD PRESSURE: 143 MMHG | SYSTOLIC BLOOD PRESSURE: 143 MMHG | TEMPERATURE: 97.3 F | DIASTOLIC BLOOD PRESSURE: 79 MMHG | HEIGHT: 67 IN | OXYGEN SATURATION: 97 % | HEART RATE: 89 BPM | SYSTOLIC BLOOD PRESSURE: 142 MMHG | SYSTOLIC BLOOD PRESSURE: 135 MMHG | DIASTOLIC BLOOD PRESSURE: 80 MMHG | HEART RATE: 95 BPM | TEMPERATURE: 98 F | HEIGHT: 67 IN | RESPIRATION RATE: 16 BRPM | HEART RATE: 90 BPM | DIASTOLIC BLOOD PRESSURE: 75 MMHG | HEART RATE: 89 BPM | OXYGEN SATURATION: 96 % | RESPIRATION RATE: 18 BRPM | SYSTOLIC BLOOD PRESSURE: 137 MMHG | SYSTOLIC BLOOD PRESSURE: 135 MMHG | TEMPERATURE: 97.3 F | SYSTOLIC BLOOD PRESSURE: 131 MMHG | HEART RATE: 97 BPM | TEMPERATURE: 98 F | RESPIRATION RATE: 16 BRPM | DIASTOLIC BLOOD PRESSURE: 66 MMHG | DIASTOLIC BLOOD PRESSURE: 79 MMHG | DIASTOLIC BLOOD PRESSURE: 81 MMHG | DIASTOLIC BLOOD PRESSURE: 79 MMHG | OXYGEN SATURATION: 97 % | DIASTOLIC BLOOD PRESSURE: 66 MMHG | RESPIRATION RATE: 18 BRPM | HEART RATE: 93 BPM | DIASTOLIC BLOOD PRESSURE: 75 MMHG | HEART RATE: 89 BPM | HEART RATE: 95 BPM | OXYGEN SATURATION: 95 % | OXYGEN SATURATION: 95 % | SYSTOLIC BLOOD PRESSURE: 131 MMHG | HEART RATE: 90 BPM | RESPIRATION RATE: 18 BRPM | WEIGHT: 183 LBS | HEART RATE: 95 BPM | HEART RATE: 90 BPM | DIASTOLIC BLOOD PRESSURE: 80 MMHG | OXYGEN SATURATION: 96 % | OXYGEN SATURATION: 97 % | HEART RATE: 97 BPM | WEIGHT: 183 LBS | SYSTOLIC BLOOD PRESSURE: 135 MMHG | SYSTOLIC BLOOD PRESSURE: 142 MMHG | SYSTOLIC BLOOD PRESSURE: 137 MMHG | TEMPERATURE: 97.3 F | SYSTOLIC BLOOD PRESSURE: 125 MMHG

## 2022-12-21 DIAGNOSIS — K31.89 OTHER DISEASES OF STOMACH AND DUODENUM: ICD-10-CM

## 2022-12-21 DIAGNOSIS — K21.9 GASTRO-ESOPHAGEAL REFLUX DISEASE WITHOUT ESOPHAGITIS: ICD-10-CM

## 2022-12-21 DIAGNOSIS — K29.50 UNSPECIFIED CHRONIC GASTRITIS WITHOUT BLEEDING: ICD-10-CM

## 2022-12-21 PROBLEM — K26.9: Status: ACTIVE | Noted: 2022-12-21

## 2022-12-21 PROBLEM — T18.2XXA FOREIGN BODY IN STOMACH, INITIAL ENCOUNTER: Status: ACTIVE | Noted: 2022-12-21

## 2022-12-21 PROCEDURE — 43245 EGD DILATE STRICTURE: CPT | Performed by: INTERNAL MEDICINE

## 2023-04-06 ENCOUNTER — OFFICE (OUTPATIENT)
Dept: URBAN - METROPOLITAN AREA CLINIC 19 | Facility: CLINIC | Age: 48
End: 2023-04-06

## 2023-04-06 VITALS
OXYGEN SATURATION: 99 % | WEIGHT: 181 LBS | DIASTOLIC BLOOD PRESSURE: 77 MMHG | HEIGHT: 67 IN | SYSTOLIC BLOOD PRESSURE: 124 MMHG | HEART RATE: 74 BPM

## 2023-04-06 DIAGNOSIS — K21.9 GASTRO-ESOPHAGEAL REFLUX DISEASE WITHOUT ESOPHAGITIS: ICD-10-CM

## 2023-04-06 DIAGNOSIS — K50.818 CROHN'S DISEASE OF BOTH SMALL AND LARGE INTESTINE WITH OTHER: ICD-10-CM

## 2023-04-06 DIAGNOSIS — R19.7 DIARRHEA, UNSPECIFIED: ICD-10-CM

## 2023-04-06 PROCEDURE — 99215 OFFICE O/P EST HI 40 MIN: CPT

## 2023-04-06 RX ORDER — DEXLANSOPRAZOLE 60 MG/1
60 CAPSULE, DELAYED RELEASE ORAL
Qty: 90 | Refills: 3 | Status: COMPLETED
End: 2024-03-05

## 2023-04-06 RX ORDER — BUDESONIDE 3 MG/1
9 CAPSULE ORAL
Qty: 90 | Refills: 2 | Status: COMPLETED
Start: 2023-04-06 | End: 2023-06-07

## 2023-04-06 NOTE — SERVICEHPINOTES
48-year-old  black female with long-standing complicated Crohn's disease since 2007 returns for follow up of her Crohn's.   
br
br She is currently on Stelara injections every 8 weeks.  She was most recently seen by Dr. Quiñones 12/9/22 for breakthrough GERD, epigastric pain, reflux and chronic diarrhea. She has 7-8 BM/d  which is still her baseline. She has been on long-term PPI therapy and has switched back from Protonix to Dexilant. Routine lab 12/9/22 was remarkable for 9% iron saturation (CBC and CMP were normal), elevated CRP=16 and low Stelara level=0.5.  Fecal calprotectin was elevated at 475 (normal 0-120 ug/g).  CT abd/pelvis found "mildly thickened appearance of the terminal ileum with adjacent mesenteric inflammatory change may reflect a mild terminal ileitis in the setting of Crohn's disease."  There was also "a small supraumbilical ventral abdominal wall hernia containing a knuckle of transverse colon without incarceration or obstruction."The last GI studies were EGD/colonoscopy 9/25/19 which found small duodenal ulcers and active Crohn's disease at her ileocolic anastomosis. She has had no imaging studies since her last surgery (sigmoid resection in 2018).  She has been on chronic PPI therapy and occasionally takes Pepcid as needed.  She takes occasional Meloxicam.  Her Crohn's has been fairly well managed on the Stelara every 8 weeks for the past year after switching from Entyvio.  She had had significant issues with arthritis which is managed by rheumatologist, Ada Polk MD.  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  She initially seemed to have improvement in her arthritis symptoms and pain, but the sulfasalazine apparently started to cause headaches.  She discontinued the sulfasalazine and her rheumatologist had switched her from Celebrex to Meloxicam. Twyla has been on multiple medical treatments in the past, including biologics (Humira, Cimzia, Remicade and Stelara), antibiotics (Cipro), immunosuppressants (azathioprine), Entocort and corticosteroids. She has been on TPN and IV iron infusions. She switched from Stelara to Entyvio in August 2018 and back to Stelara in October, 2021. She has a long history of complicated fistuliziing Crohn's disease with bowel obstructions and abscesses. 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 Dr. Lawson was unsuccessful enclosing this with OTS Resolution and Ovesco clips. She apparently had a postop sterile seroma documented on CT abdomen/pelvis, 7/10/18 which required drainage with a woundvac for a time. She has no previous history of ankylosing spondylitis and lumbar spine x-ray 2/1/19 was normal.She underwent ileocolonic resection 10/25/17 by Stephen Behrman, MD for active Crohn's with fistula and abscess. Her last CT abdomen/pelvis 2/21/18 found "no significant change in appearance of right lower quadrant abscess cavity with fistulous connection to the sigmoid colon. Contrast from recent abscessogram clearly communicates with the sigmoid colon via the known fistula."FHx is negative for IBD or colon neoplasm.

## 2023-04-07 LAB
C-REACTIVE PROTEIN, QUANT: 14 MG/L — HIGH (ref 0–10)
CBC, PLATELET, NO DIFFERENTIAL: HEMATOCRIT: 39.2 % (ref 34–46.6)
CBC, PLATELET, NO DIFFERENTIAL: HEMOGLOBIN: 12.5 G/DL (ref 11.1–15.9)
CBC, PLATELET, NO DIFFERENTIAL: MCH: 26.2 PG — LOW (ref 26.6–33)
CBC, PLATELET, NO DIFFERENTIAL: MCHC: 31.9 G/DL (ref 31.5–35.7)
CBC, PLATELET, NO DIFFERENTIAL: MCV: 82 FL (ref 79–97)
CBC, PLATELET, NO DIFFERENTIAL: PLATELETS: 370 X10E3/UL (ref 150–450)
CBC, PLATELET, NO DIFFERENTIAL: RBC: 4.78 X10E6/UL (ref 3.77–5.28)
CBC, PLATELET, NO DIFFERENTIAL: RDW: 14.3 % (ref 11.7–15.4)
CBC, PLATELET, NO DIFFERENTIAL: WBC: 5.4 X10E3/UL (ref 3.4–10.8)
COMP. METABOLIC PANEL (14): A/G RATIO: 1.8 (ref 1.2–2.2)
COMP. METABOLIC PANEL (14): ALBUMIN: 4.8 G/DL (ref 3.8–4.8)
COMP. METABOLIC PANEL (14): ALKALINE PHOSPHATASE: 99 IU/L (ref 44–121)
COMP. METABOLIC PANEL (14): ALT (SGPT): 34 IU/L — HIGH (ref 0–32)
COMP. METABOLIC PANEL (14): AST (SGOT): 30 IU/L (ref 0–40)
COMP. METABOLIC PANEL (14): BILIRUBIN, TOTAL: 0.4 MG/DL (ref 0–1.2)
COMP. METABOLIC PANEL (14): BUN/CREATININE RATIO: 12 (ref 9–23)
COMP. METABOLIC PANEL (14): BUN: 8 MG/DL (ref 6–24)
COMP. METABOLIC PANEL (14): CALCIUM: 10.3 MG/DL — HIGH (ref 8.7–10.2)
COMP. METABOLIC PANEL (14): CARBON DIOXIDE, TOTAL: 23 MMOL/L (ref 20–29)
COMP. METABOLIC PANEL (14): CHLORIDE: 101 MMOL/L (ref 96–106)
COMP. METABOLIC PANEL (14): CREATININE: 0.68 MG/DL (ref 0.57–1)
COMP. METABOLIC PANEL (14): EGFR: 108 ML/MIN/1.73 (ref 59–?)
COMP. METABOLIC PANEL (14): GLOBULIN, TOTAL: 2.6 G/DL (ref 1.5–4.5)
COMP. METABOLIC PANEL (14): GLUCOSE: 81 MG/DL (ref 70–99)
COMP. METABOLIC PANEL (14): POTASSIUM: 4.3 MMOL/L (ref 3.5–5.2)
COMP. METABOLIC PANEL (14): PROTEIN, TOTAL: 7.4 G/DL (ref 6–8.5)
COMP. METABOLIC PANEL (14): SODIUM: 142 MMOL/L (ref 134–144)
SEDIMENTATION RATE-WESTERGREN: 22 MM/HR (ref 0–32)

## 2023-04-11 ENCOUNTER — OFFICE (OUTPATIENT)
Dept: URBAN - METROPOLITAN AREA CLINIC 11 | Facility: CLINIC | Age: 48
End: 2023-04-11

## 2023-04-11 VITALS — HEIGHT: 67 IN

## 2023-04-11 DIAGNOSIS — K50.818 CROHN'S DISEASE OF BOTH SMALL AND LARGE INTESTINE WITH OTHER: ICD-10-CM

## 2023-04-11 PROCEDURE — 99213 OFFICE O/P EST LOW 20 MIN: CPT | Mod: 95

## 2023-04-11 NOTE — SERVICENOTES
We discussed Skyrizi administration, risks, benefits, side effects, and efficacy at length. I answered all of her questions. With the previous biologic failures, Skyrizi is currently the only option for Crohn's disease. She is interested in an oral option, but there is not one available at this time. She is agreeable to starting Skyrizi.

## 2023-04-11 NOTE — SERVICEHPINOTES
Twyla Hoyt   is a   48   year old  female   with a history of Crohn's disease being seen via telehealth today to discuss treatment options. She was initially diagnosed with Crohn's disease in 2007  . She has previously been treated with Remicade, Enyvio, Humira, and Cimzia. She is currently on Stelara 90mg subcutaneously every 8 weeks. She currently has about 7 bowel movements per day with no blood or mucus. She reports experiencing significant itching about 3 days following her last injection and is very concerned that she will continue to have worsening reactions with each injection. She denies any    
br
br   INFLAMMATORY BOWEL DISEASE SUMMARY:
br Diagnosis: Crohn's diseasebr Diagnosis date: 2007
br Extent: fistulizing, stricturingbrEIM: joint
brPreviously treated with:
br 
  - Azathiorprine
br   - Sulfasalazinebr   - Corticosteroids
br   - Remicade, stopped due to anti-infliximab antibodies
br   - Entyvio, [id number], did not control joint painbr   - Humira br   - Cimzia
br
Currently treated with: Stelara 90mg SQ every 8 weeks, started October 2021

## 2023-04-26 ENCOUNTER — OFFICE (OUTPATIENT)
Dept: URBAN - METROPOLITAN AREA CLINIC 11 | Facility: CLINIC | Age: 48
End: 2023-04-26
Payer: COMMERCIAL

## 2023-04-26 VITALS
SYSTOLIC BLOOD PRESSURE: 108 MMHG | DIASTOLIC BLOOD PRESSURE: 74 MMHG | TEMPERATURE: 97.4 F | TEMPERATURE: 97.3 F | DIASTOLIC BLOOD PRESSURE: 68 MMHG | SYSTOLIC BLOOD PRESSURE: 116 MMHG | DIASTOLIC BLOOD PRESSURE: 79 MMHG | HEART RATE: 80 BPM | DIASTOLIC BLOOD PRESSURE: 73 MMHG | HEIGHT: 67 IN | SYSTOLIC BLOOD PRESSURE: 121 MMHG | HEART RATE: 81 BPM

## 2023-04-26 DIAGNOSIS — K50.90 CROHN'S DISEASE, UNSPECIFIED, WITHOUT COMPLICATIONS: ICD-10-CM

## 2023-04-26 PROCEDURE — 96413 CHEMO IV INFUSION 1 HR: CPT | Performed by: INTERNAL MEDICINE

## 2023-04-26 NOTE — SERVICENOTES
Next Skyrizi appt. is 5/24/2023 @ 9:30.
Patient observed for 15 minutes post infusion. 
Patient denies chest pain, shortness of breath or dizziness.  
Handout given and reviewed with patient.
Patient was instructed on common side effects.
Patient verbalized a complete understanding and has no questions.

## 2023-04-26 NOTE — SERVICEHPINOTES
See follow up visit from 4/6/2023 .brDate / Results of last TB test 4/20/2023. - negative.brLabs: CBC, CMP due in October.brInsurance: [employer] approved Skyrizi induction 4/17/23-7/17/23(BB)brPharmacy: [facility]brNegative coronavirus screeningbrRate of Infusion: Standard

## 2023-05-24 ENCOUNTER — OFFICE (OUTPATIENT)
Dept: URBAN - METROPOLITAN AREA CLINIC 11 | Facility: CLINIC | Age: 48
End: 2023-05-24
Payer: COMMERCIAL

## 2023-05-24 VITALS
SYSTOLIC BLOOD PRESSURE: 133 MMHG | SYSTOLIC BLOOD PRESSURE: 128 MMHG | HEART RATE: 80 BPM | RESPIRATION RATE: 18 BRPM | HEART RATE: 81 BPM | DIASTOLIC BLOOD PRESSURE: 79 MMHG | SYSTOLIC BLOOD PRESSURE: 125 MMHG | TEMPERATURE: 98.4 F | SYSTOLIC BLOOD PRESSURE: 126 MMHG | TEMPERATURE: 97.4 F | DIASTOLIC BLOOD PRESSURE: 72 MMHG | HEIGHT: 67 IN | DIASTOLIC BLOOD PRESSURE: 75 MMHG | DIASTOLIC BLOOD PRESSURE: 76 MMHG

## 2023-05-24 DIAGNOSIS — K50.90 CROHN'S DISEASE, UNSPECIFIED, WITHOUT COMPLICATIONS: ICD-10-CM

## 2023-05-24 PROCEDURE — 96413 CHEMO IV INFUSION 1 HR: CPT | Performed by: INTERNAL MEDICINE

## 2023-05-24 NOTE — SERVICEHPINOTES
See follow up visit from  4/11/2023   .  brDate / Results of last TB test  4/20/2023   -   Negative  brLabs and/or Additional orders: CBC and CMP due in OctoberbrInsurance authorization:[employer] approved Skyrizi induction 4/17/23-7/17/23(BB)brPharmacy:  [facility]  brRate of Infusion:  Standard   
br   Infusion order placed on:  4/18/2023   
br

## 2023-06-07 ENCOUNTER — OFFICE (OUTPATIENT)
Dept: URBAN - METROPOLITAN AREA CLINIC 19 | Facility: CLINIC | Age: 48
End: 2023-06-07

## 2023-06-07 VITALS
DIASTOLIC BLOOD PRESSURE: 77 MMHG | OXYGEN SATURATION: 99 % | WEIGHT: 185 LBS | HEART RATE: 74 BPM | SYSTOLIC BLOOD PRESSURE: 131 MMHG | HEIGHT: 67 IN

## 2023-06-07 DIAGNOSIS — K50.818 CROHN'S DISEASE OF BOTH SMALL AND LARGE INTESTINE WITH OTHER: ICD-10-CM

## 2023-06-07 DIAGNOSIS — R10.31 RIGHT LOWER QUADRANT PAIN: ICD-10-CM

## 2023-06-07 DIAGNOSIS — K92.1 MELENA: ICD-10-CM

## 2023-06-07 DIAGNOSIS — K21.9 GASTRO-ESOPHAGEAL REFLUX DISEASE WITHOUT ESOPHAGITIS: ICD-10-CM

## 2023-06-07 PROCEDURE — 99214 OFFICE O/P EST MOD 30 MIN: CPT

## 2023-06-07 RX ORDER — DICYCLOMINE HYDROCHLORIDE 10 MG/1
CAPSULE ORAL
Qty: 90 | Refills: 3 | Status: ACTIVE
Start: 2022-09-28

## 2023-06-07 RX ORDER — DEXLANSOPRAZOLE 60 MG/1
60 CAPSULE, DELAYED RELEASE ORAL
Qty: 90 | Refills: 3 | Status: COMPLETED
End: 2024-03-05

## 2023-06-07 NOTE — SERVICEHPINOTES
48-year-old  black female with long-standing complicated Crohn's disease since 2007 returns for complaints of blood in her stool and RLQ pain.
br
br   She has had very longstanding complicated Crohn's disease since 2007. I most recently saw her 4/6/23 and she was on Stelara every 8 weeks at the time. Routine lab 12/9/22 was remarkable for 9% iron saturation (CBC and CMP were normal), elevated CRP=16 and low Stelara level=0.5.  Fecal calprotectin was elevated at 475 (normal 0-120 ug/g).  CT abd/pelvis 12/22/22 found "mildly thickened appearance of the terminal ileum with adjacent mesenteric inflammatory change may reflect a mild terminal ileitis in the setting of Crohn's disease."  There was also "a small supraumbilical ventral abdominal wall hernia containing a knuckle of transverse colon without incarceration or obstruction." 
br
br She was having 7-8 BMs/d and having headaches every time after her Stelara injections and worried about these worsening. She ended up meeting with Hannah Cantu NP with [facility] 4/11/23 and agreed to switch biologics to Skyrizi. She received induction 4/26, 5/24 and then the final induction infusion on 6/21. br   
br
 she returns today for complaints of blood in her stool and RLQ pain.  She has had the right lower quadrant pain for years ever since she had a resection in that area.  It seems to have worsened over the last few weeks and she has noticed blood in her stool ever since starting Skyrizi.  She is concerned about these symptoms and would like them to be evaluated.  She continues to have 7-8 BMs/d and has not yet noticed a significant change in her symptoms.  She had previously been on budesonide 9 mg daily, but discontinued that when she had her 1st Skyrizi infusion.  Her reflux seems to be well managed on Dexilant which was switched from Protonix previously.  She takes Bentyl 10 mg as needed for abdominal cramping.br
brEGD 12/21/22 found pyloric channel stenosis with GOO (dilated 10-12 mm). Unfortunately she developed upper respiratory symptoms and was hospitalized from 12/20-12/25 when she left AMA from [facility] [place]. It took her to 3 months to fully recover from her aspiration pneumonia.EGD/colonoscopy 9/25/19 found small duodenal ulcers and active Crohn's disease at her ileocolic anastomosis. She has had no imaging studies since her last surgery (sigmoid resection in 2018).  She has been on chronic PPI therapy and occasionally takes Pepcid as needed.  She takes occasional Meloxicam.  She had had significant issues with arthritis which is managed by rheumatologist, Ada Polk MD.  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  She initially seemed to have improvement in her arthritis symptoms and pain, but the sulfasalazine apparently started to cause headaches.  She discontinued the sulfasalazine and her rheumatologist had switched her from Celebrex to Meloxicam. Twyla has been on multiple medical treatments in the past, including biologics (Humira, Cimzia, Remicade and Stelara), antibiotics (Cipro), immunosuppressants (azathioprine), Entocort and corticosteroids. She has been on TPN and IV iron infusions. She switched from Stelara to Entyvio in August 2018 and back to Stelara in October, 2021. She has a long history of complicated fistuliziing Crohn's disease with bowel obstructions and abscesses. 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 Dr. Lawson was unsuccessful enclosing this with OTS Resolution and Ovesco clips. She apparently had a postop sterile seroma documented on CT abdomen/pelvis, 7/10/18 which required drainage with a woundvac for a time. She has no previous history of ankylosing spondylitis and lumbar spine x-ray 2/1/19 was normal.She underwent ileocolonic resection 10/25/17 by Stephen Behrman, MD for active Crohn's with fistula and abscess. Her last CT abdomen/pelvis 2/21/18 found "no significant change in appearance of right lower quadrant abscess cavity with fistulous connection to the sigmoid colon. Contrast from recent abscessogram clearly communicates with the sigmoid colon via the known fistula."FHx is negative for IBD or colon neoplasm.

## 2023-06-08 LAB
C-REACTIVE PROTEIN, QUANT: 14 MG/L — HIGH (ref 0–10)
CBC, PLATELET, NO DIFFERENTIAL: HEMATOCRIT: 35.7 % (ref 34–46.6)
CBC, PLATELET, NO DIFFERENTIAL: HEMOGLOBIN: 11.1 G/DL (ref 11.1–15.9)
CBC, PLATELET, NO DIFFERENTIAL: MCH: 25.8 PG — LOW (ref 26.6–33)
CBC, PLATELET, NO DIFFERENTIAL: MCHC: 31.1 G/DL — LOW (ref 31.5–35.7)
CBC, PLATELET, NO DIFFERENTIAL: MCV: 83 FL (ref 79–97)
CBC, PLATELET, NO DIFFERENTIAL: PLATELETS: 347 X10E3/UL (ref 150–450)
CBC, PLATELET, NO DIFFERENTIAL: RBC: 4.3 X10E6/UL (ref 3.77–5.28)
CBC, PLATELET, NO DIFFERENTIAL: RDW: 14.4 % (ref 11.7–15.4)
CBC, PLATELET, NO DIFFERENTIAL: WBC: 4.1 X10E3/UL (ref 3.4–10.8)
COMP. METABOLIC PANEL (14): A/G RATIO: 1.7 (ref 1.2–2.2)
COMP. METABOLIC PANEL (14): ALBUMIN: 4.6 G/DL (ref 3.8–4.8)
COMP. METABOLIC PANEL (14): ALKALINE PHOSPHATASE: 91 IU/L (ref 44–121)
COMP. METABOLIC PANEL (14): ALT (SGPT): 28 IU/L (ref 0–32)
COMP. METABOLIC PANEL (14): AST (SGOT): 27 IU/L (ref 0–40)
COMP. METABOLIC PANEL (14): BILIRUBIN, TOTAL: 0.4 MG/DL (ref 0–1.2)
COMP. METABOLIC PANEL (14): BUN/CREATININE RATIO: 13 (ref 9–23)
COMP. METABOLIC PANEL (14): BUN: 9 MG/DL (ref 6–24)
COMP. METABOLIC PANEL (14): CALCIUM: 9.6 MG/DL (ref 8.7–10.2)
COMP. METABOLIC PANEL (14): CARBON DIOXIDE, TOTAL: 23 MMOL/L (ref 20–29)
COMP. METABOLIC PANEL (14): CHLORIDE: 100 MMOL/L (ref 96–106)
COMP. METABOLIC PANEL (14): CREATININE: 0.72 MG/DL (ref 0.57–1)
COMP. METABOLIC PANEL (14): EGFR: 104 ML/MIN/1.73 (ref 59–?)
COMP. METABOLIC PANEL (14): GLOBULIN, TOTAL: 2.7 G/DL (ref 1.5–4.5)
COMP. METABOLIC PANEL (14): GLUCOSE: 92 MG/DL (ref 70–99)
COMP. METABOLIC PANEL (14): POTASSIUM: 4.1 MMOL/L (ref 3.5–5.2)
COMP. METABOLIC PANEL (14): PROTEIN, TOTAL: 7.3 G/DL (ref 6–8.5)
COMP. METABOLIC PANEL (14): SODIUM: 141 MMOL/L (ref 134–144)
SEDIMENTATION RATE-WESTERGREN: 17 MM/HR (ref 0–32)

## 2023-06-21 ENCOUNTER — OFFICE (OUTPATIENT)
Dept: URBAN - METROPOLITAN AREA CLINIC 11 | Facility: CLINIC | Age: 48
End: 2023-06-21
Payer: COMMERCIAL

## 2023-06-21 VITALS
SYSTOLIC BLOOD PRESSURE: 146 MMHG | HEIGHT: 67 IN | SYSTOLIC BLOOD PRESSURE: 135 MMHG | TEMPERATURE: 98 F | SYSTOLIC BLOOD PRESSURE: 140 MMHG | DIASTOLIC BLOOD PRESSURE: 79 MMHG | RESPIRATION RATE: 18 BRPM | SYSTOLIC BLOOD PRESSURE: 130 MMHG | HEART RATE: 75 BPM | DIASTOLIC BLOOD PRESSURE: 85 MMHG | DIASTOLIC BLOOD PRESSURE: 80 MMHG | HEART RATE: 79 BPM | HEART RATE: 76 BPM | TEMPERATURE: 97.8 F | HEART RATE: 78 BPM

## 2023-06-21 DIAGNOSIS — K50.90 CROHN'S DISEASE, UNSPECIFIED, WITHOUT COMPLICATIONS: ICD-10-CM

## 2023-06-21 PROCEDURE — 96372 THER/PROPH/DIAG INJ SC/IM: CPT | Mod: 59 | Performed by: INTERNAL MEDICINE

## 2023-06-21 PROCEDURE — 96413 CHEMO IV INFUSION 1 HR: CPT | Performed by: INTERNAL MEDICINE

## 2023-06-21 RX ADMIN — CYANOCOBALAMIN 1000 MCG: 1000 INJECTION, SOLUTION INTRAMUSCULAR at 11:02

## 2023-06-21 NOTE — SERVICEHPINOTES
See follow up visit from  6/7/2023   .  brDate / Results of last TB test  4/20/2023   -   Negative  brLabs and/or Additional orders: CBC and CMP due in Dec CMP drawn today per orderbrInsurance authorization:Analia approved Jamalizi induction 4/17/23-7/17/23(BB)brPharmacy:  [facility]  brRate of Infusion:  1 hour   
br   Infusion order placed on:  4/18/2023   
br

## 2024-03-05 ENCOUNTER — OFFICE (OUTPATIENT)
Dept: URBAN - METROPOLITAN AREA CLINIC 19 | Facility: CLINIC | Age: 49
End: 2024-03-05

## 2024-03-05 VITALS
WEIGHT: 182 LBS | OXYGEN SATURATION: 98 % | HEART RATE: 82 BPM | DIASTOLIC BLOOD PRESSURE: 87 MMHG | HEIGHT: 67 IN | SYSTOLIC BLOOD PRESSURE: 135 MMHG

## 2024-03-05 DIAGNOSIS — K21.9 GASTRO-ESOPHAGEAL REFLUX DISEASE WITHOUT ESOPHAGITIS: ICD-10-CM

## 2024-03-05 DIAGNOSIS — K50.818 CROHN'S DISEASE OF BOTH SMALL AND LARGE INTESTINE WITH OTHER: ICD-10-CM

## 2024-03-05 PROCEDURE — 99214 OFFICE O/P EST MOD 30 MIN: CPT

## 2024-03-05 RX ORDER — BUDESONIDE 3 MG/1
3 CAPSULE ORAL
Qty: 90 | Refills: 6 | Status: ACTIVE

## 2024-03-05 RX ORDER — OMEPRAZOLE 40 MG/1
40 CAPSULE, DELAYED RELEASE ORAL
Qty: 30 | Refills: 6 | Status: ACTIVE

## 2024-03-05 RX ORDER — FERROUS SULFATE 325(65) MG
650 TABLET ORAL
Qty: 60 | Refills: 11 | Status: ACTIVE

## 2024-03-05 NOTE — SERVICENOTES
The patient's assessment was reviewed with Oren Quiñones MD and a collaborative plan of care was established.

## 2024-03-05 NOTE — SERVICEHPINOTES
49-year-old  BF with long-standing complicated Crohn's disease since 2007 returns for complaints of breakthrough GERD despite appropriate therapy. She was previously on Dexilant 60 mg/d, which has worked well for her for quite some time. Over the past month, she experienced breakthrough symptoms despite taking Dexilant and Pepcid 40 mg/hs. Her PCP stopped Dexilant and started Prilosec 40 mg BID 1 week ago. She reports her symptoms are 70% better since switching to Omeprazole BID. Her Stelara was switched to Skyrizi in May 2023. She was having 7-8 BMs/d and having headaches every time after her Stelara injections and worried about these worsening. She ended up meeting with Hannah Cantu NP with [facility] 4/11/23 and agreed to switch biologics to Skyrizi. She received induction 4/26/23, 5/24/23 and then the final induction infusion on 6/21/23.  Her Crohn's seems well controlled on Budesonide 9 mg/d and Skyrizi injections q8w and her last at home injection was 1 week ago. She denies dysphagia, nausea, vomiting, abdominal pain, change in bowel habit or any overt GI bleeding. She takes Bentyl 10 mg as needed for abdominal cramping.
clover Huang last office visit 6/7/23 was for complaints of blood in her stool and chronic RLQ pain. She had the RLQ pain for years ever since she had a resection in that area, but it seemed to have worsened over the past few weeks and she had noticed blood in her stool ever since starting Skyrizi. She continued to have 7-8 BMs/d and had not yet noticed a significant change in her symptoms.  She had previously been on Budesonide 9 mg daily, but discontinued that when she had her 1st Skyrizi infusion.  Routine labs 6/7/23 was unremarkable except for mildly elevated CRP=14. CT abd/pelvis 6/19/23 found Crohn's disease with active inflammation involving the cecum and neoterminal ileum, regional mesenteric adenopathy, hepatic steatosis, and small midline epigastric ventral abdominal hernia containing small portion of the transverse colon with no obstruction. 6/21/23 fecal [id number]. 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 Unfortunately she developed upper respiratory symptoms and was hospitalized from 12/20[place]12[place] [facility] [place]. It took her to 3 months to fully recover from her aspiration pneumonia.EGD/colonoscopy 9/25/19 found small duodenal ulcers and active Crohn's disease at her ileocolic anastomosis. She has had no imaging studies since her last surgery (sigmoid resection in 2018).  She has been on chronic PPI therapy and occasionally takes Pepcid as needed.  She takes occasional Meloxicam.  She had significant issues with arthritis which is managed by rheumatologist, Ada Polk MD.  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  She initially seemed to have improvement in her arthritis symptoms and pain, but the sulfasalazine apparently started to cause headaches. She discontinued the sulfasalazine and her rheumatologist had switched her from Celebrex to Meloxicam. Twyla has been on multiple medical treatments in the past, including biologics (Humira, Cimzia, Remicade and Stelara), antibiotics (Cipro), immunosuppressants (azathioprine), Entocort and corticosteroids. She has been on TPN and IV iron infusions. She switched from Stelara to Entyvio in August 2018 and back to Stelara in October, 2021. She has a long history of complicated fistuliziing Crohn's disease with bowel obstructions and abscesses. 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 Dr. Lawson was unsuccessful enclosing this with OTS Resolution and Ovesco clips. She apparently had a postop sterile seroma documented on CT abdomen/pelvis, 7/10/18 which required drainage with a woundvac for a time. She has no previous history of ankylosing spondylitis and lumbar spine x-ray 2/1/19 was normal.She underwent ileocolonic resection 10/25/17 by Stephen Behrman, MD for active Crohn's with fistula and abscess. CT abdomen/pelvis 2/21/18 found "no significant change in appearance of right lower quadrant abscess cavity with fistulous connection to the sigmoid colon. Contrast from abscessogram clearly communicates with the sigmoid colon via the known fistula."FHx is negative for IBD or colon neoplasm.

## 2024-07-18 ENCOUNTER — OFFICE (OUTPATIENT)
Dept: URBAN - METROPOLITAN AREA CLINIC 19 | Facility: CLINIC | Age: 49
End: 2024-07-18

## 2024-07-18 VITALS
SYSTOLIC BLOOD PRESSURE: 128 MMHG | WEIGHT: 191 LBS | HEART RATE: 78 BPM | OXYGEN SATURATION: 100 % | HEIGHT: 67 IN | DIASTOLIC BLOOD PRESSURE: 82 MMHG

## 2024-07-18 DIAGNOSIS — Z86.010 PERSONAL HISTORY OF COLONIC POLYPS: ICD-10-CM

## 2024-07-18 DIAGNOSIS — K21.9 GASTRO-ESOPHAGEAL REFLUX DISEASE WITHOUT ESOPHAGITIS: ICD-10-CM

## 2024-07-18 DIAGNOSIS — K50.818 CROHN'S DISEASE OF BOTH SMALL AND LARGE INTESTINE WITH OTHER: ICD-10-CM

## 2024-07-18 PROCEDURE — 99214 OFFICE O/P EST MOD 30 MIN: CPT

## 2024-07-18 RX ORDER — OMEPRAZOLE 40 MG/1
40 CAPSULE, DELAYED RELEASE ORAL
Qty: 30 | Refills: 6 | Status: ACTIVE

## 2024-07-18 RX ORDER — FERROUS SULFATE 325(65) MG
650 TABLET ORAL
Qty: 60 | Refills: 11 | Status: ACTIVE

## 2024-07-18 RX ORDER — SODIUM PICOSULFATE, MAGNESIUM OXIDE, AND ANHYDROUS CITRIC ACID 12; 3.5; 1 G/175ML; G/175ML; MG/175ML
LIQUID ORAL
Qty: 1 | Refills: 0 | Status: ACTIVE
Start: 2024-07-18

## 2024-07-18 NOTE — SERVICEHPINOTES
49-year-old  BF with long-standing complicated Crohn's disease since 2007 returns for complaints of intermittent right sided abdominal pain and diarrhea that typically occur 1-2 weeks before it's time for her next Skyrizi injection since she tapered off her Budesonide 9 mg/d. She has been taking Budesonide 3 mg as needed which seems to help with the right sided abdominal pain when it occurs. Her GERD seems well controlled with Prilosec 40 mg/d and Pepcid 40 mg/hs as needed. She denies dysphagia, nausea, vomiting or any overt GI bleeding. Routine lab today is remarkable for a mildly elevated ESR=37.clover Monsalve saw her 3/5/24 for complaints of breakthrough GERD despite appropriate therapy. She was previously on Dexilant 60 mg/d, which has worked well for her for quite some time. Her PCP stopped Dexilant and started Prilosec 40 mg BID. Her Stelara was switched to Skyrizi in May 2023. She was having 7-8 BMs/d and having headaches every time after her Stelara injections and worried about these worsening. She ended up meeting with Hannah Cantu NP with [facility] 4/11/23 and agreed to switch biologics to Skyrizi. She received induction 4/26/23, 5/24/23 and then the final induction infusion on 6/21/23. Routine lab 3/5/24 was unremarkable. clover Huang office visit 6/7/23 was for complaints of blood in her stool and chronic RLQ pain. She had the RLQ pain for years ever since she had a resection in that area, but it seemed to have worsened over the past few weeks and she had noticed blood in her stool ever since starting Skyrizi. She continued to have 7-8 BMs/d and had not yet noticed a significant change in her symptoms.  She had previously been on Budesonide 9 mg daily, but discontinued that when she had her 1st Skyrizi infusion.  Routine labs 6/7/23 was unremarkable except for mildly elevated CRP=14. CT abd/pelvis 6/19/23 found Crohn's disease with active inflammation involving the cecum and neoterminal ileum, regional mesenteric adenopathy, hepatic steatosis, and small midline epigastric ventral abdominal hernia containing small portion of the transverse colon with no obstruction. 6/21/23 fecal [id number]. 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 Unfortunately she developed upper respiratory symptoms and was hospitalized from 12/20-12/25 when she left AMA from [facility]. It took her to 3 months to fully recover from her aspiration pneumonia.EGD/colonoscopy 9/25/19 found small duodenal ulcers and active Crohn's disease at her ileocolic anastomosis. She has had no imaging studies since her last surgery (sigmoid resection in 2018).  She has been on chronic PPI therapy and occasionally takes Pepcid as needed.  She takes occasional Meloxicam.  She had significant issues with arthritis which is managed by rheumatologist, Ada Polk MD.  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  She initially seemed to have improvement in her arthritis symptoms and pain, but the sulfasalazine apparently started to cause headaches. She discontinued the sulfasalazine and her rheumatologist had switched her from Celebrex to Meloxicam. Twyla has been on multiple medical treatments in the past, including biologics (Humira, Cimzia, Remicade and Stelara), antibiotics (Cipro), immunosuppressants (azathioprine), Entocort and corticosteroids. She has been on TPN and IV iron infusions. She switched from Stelara to Entyvio in August 2018 and back to Stelara in October, 2021. She has a long history of complicated fistuliziing Crohn's disease with bowel obstructions and abscesses. 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 Dr. Lawson was unsuccessful enclosing this with OTS Resolution and Ovesco clips. She apparently had a postop sterile seroma documented on CT abdomen/pelvis, 7/10/18 which required drainage with a woundvac for a time. She has no previous history of ankylosing spondylitis and lumbar spine x-ray 2/1/19 was normal.She underwent ileocolonic resection 10/25/17 by Stephen Behrman, MD for active Crohn's with fistula and abscess. CT abdomen/pelvis 2/21/18 found "no significant change in appearance of right lower quadrant abscess cavity with fistulous connection to the sigmoid colon. Contrast from abscessogram clearly communicates with the sigmoid colon via the known fistula."FHx is negative for IBD or colon neoplasm.

## 2024-09-26 PROBLEM — K21.9: Status: ACTIVE | Noted: 2022-12-21

## 2025-02-26 ENCOUNTER — OFFICE (OUTPATIENT)
Dept: URBAN - METROPOLITAN AREA CLINIC 10 | Facility: CLINIC | Age: 50
End: 2025-02-26
Payer: MEDICARE

## 2025-02-26 VITALS
WEIGHT: 189 LBS | HEART RATE: 92 BPM | OXYGEN SATURATION: 99 % | SYSTOLIC BLOOD PRESSURE: 119 MMHG | DIASTOLIC BLOOD PRESSURE: 75 MMHG | HEIGHT: 67 IN

## 2025-02-26 DIAGNOSIS — T45.4X5A ADVERSE EFFECT OF IRON AND ITS COMPOUNDS, INITIAL ENCOUNTER: ICD-10-CM

## 2025-02-26 DIAGNOSIS — K50.818 CROHN'S DISEASE OF BOTH SMALL AND LARGE INTESTINE WITH OTHER: ICD-10-CM

## 2025-02-26 DIAGNOSIS — K21.9 GASTRO-ESOPHAGEAL REFLUX DISEASE WITHOUT ESOPHAGITIS: ICD-10-CM

## 2025-02-26 PROCEDURE — 99214 OFFICE O/P EST MOD 30 MIN: CPT

## 2025-02-26 RX ORDER — DICYCLOMINE HYDROCHLORIDE 10 MG/1
CAPSULE ORAL
Qty: 90 | Refills: 6 | Status: ACTIVE
Start: 2022-09-28

## 2025-02-26 RX ORDER — OMEPRAZOLE 40 MG/1
40 CAPSULE, DELAYED RELEASE ORAL
Qty: 30 | Refills: 11 | Status: ACTIVE

## 2025-02-27 LAB
C-REACTIVE PROTEIN, QUANT: 21 MG/L — HIGH (ref 0–10)
CBC, PLATELET, NO DIFFERENTIAL: HEMATOCRIT: 35.6 % (ref 34–46.6)
CBC, PLATELET, NO DIFFERENTIAL: HEMOGLOBIN: 11.2 G/DL (ref 11.1–15.9)
CBC, PLATELET, NO DIFFERENTIAL: MCH: 27.5 PG (ref 26.6–33)
CBC, PLATELET, NO DIFFERENTIAL: MCHC: 31.5 G/DL (ref 31.5–35.7)
CBC, PLATELET, NO DIFFERENTIAL: MCV: 88 FL (ref 79–97)
CBC, PLATELET, NO DIFFERENTIAL: PLATELETS: 322 X10E3/UL (ref 150–450)
CBC, PLATELET, NO DIFFERENTIAL: RBC: 4.07 X10E6/UL (ref 3.77–5.28)
CBC, PLATELET, NO DIFFERENTIAL: RDW: 14.1 % (ref 11.7–15.4)
CBC, PLATELET, NO DIFFERENTIAL: WBC: 4.4 X10E3/UL (ref 3.4–10.8)
COMP. METABOLIC PANEL (14): ALBUMIN: 4.4 G/DL (ref 3.9–4.9)
COMP. METABOLIC PANEL (14): ALKALINE PHOSPHATASE: 97 IU/L (ref 44–121)
COMP. METABOLIC PANEL (14): ALT (SGPT): 45 IU/L — HIGH (ref 0–32)
COMP. METABOLIC PANEL (14): AST (SGOT): 45 IU/L — HIGH (ref 0–40)
COMP. METABOLIC PANEL (14): BILIRUBIN, TOTAL: 0.4 MG/DL (ref 0–1.2)
COMP. METABOLIC PANEL (14): BUN/CREATININE RATIO: 13 (ref 9–23)
COMP. METABOLIC PANEL (14): BUN: 10 MG/DL (ref 6–24)
COMP. METABOLIC PANEL (14): CALCIUM: 9.9 MG/DL (ref 8.7–10.2)
COMP. METABOLIC PANEL (14): CARBON DIOXIDE, TOTAL: 22 MMOL/L (ref 20–29)
COMP. METABOLIC PANEL (14): CHLORIDE: 100 MMOL/L (ref 96–106)
COMP. METABOLIC PANEL (14): CREATININE: 0.78 MG/DL (ref 0.57–1)
COMP. METABOLIC PANEL (14): EGFR: 93 ML/MIN/1.73 (ref 59–?)
COMP. METABOLIC PANEL (14): GLOBULIN, TOTAL: 2.8 G/DL (ref 1.5–4.5)
COMP. METABOLIC PANEL (14): GLUCOSE: 86 MG/DL (ref 70–99)
COMP. METABOLIC PANEL (14): POTASSIUM: 4.5 MMOL/L (ref 3.5–5.2)
COMP. METABOLIC PANEL (14): PROTEIN, TOTAL: 7.2 G/DL (ref 6–8.5)
COMP. METABOLIC PANEL (14): SODIUM: 138 MMOL/L (ref 134–144)
FE+TIBC+FER: FERRITIN: 419 NG/ML — HIGH (ref 15–150)
FE+TIBC+FER: IRON BIND.CAP.(TIBC): 403 UG/DL (ref 250–450)
FE+TIBC+FER: IRON SATURATION: 14 % — LOW (ref 15–55)
FE+TIBC+FER: IRON: 57 UG/DL (ref 27–159)
FE+TIBC+FER: UIBC: 346 UG/DL (ref 131–425)
SEDIMENTATION RATE-WESTERGREN: 18 MM/HR (ref 0–32)